# Patient Record
Sex: MALE | Race: WHITE | Employment: FULL TIME | URBAN - METROPOLITAN AREA
[De-identification: names, ages, dates, MRNs, and addresses within clinical notes are randomized per-mention and may not be internally consistent; named-entity substitution may affect disease eponyms.]

---

## 2018-05-02 ENCOUNTER — TRANSFERRED RECORDS (OUTPATIENT)
Dept: HEALTH INFORMATION MANAGEMENT | Facility: CLINIC | Age: 48
End: 2018-05-02

## 2018-05-02 LAB
CHOLESTEROL, TOTAL: 140 MG/DL
HDLC SERPL-MCNC: 33 MG/DL
LDL CHOLESTEROL: 65 MG/DL
TRIGL SERPL-MCNC: 212 MG/DL

## 2018-11-16 ENCOUNTER — HOSPITAL ENCOUNTER (EMERGENCY)
Facility: CLINIC | Age: 48
Discharge: HOME | End: 2018-11-16
Attending: EMERGENCY MEDICINE | Admitting: EMERGENCY MEDICINE
Payer: OTHER GOVERNMENT

## 2018-11-16 VITALS
OXYGEN SATURATION: 98 % | HEART RATE: 63 BPM | TEMPERATURE: 98 F | SYSTOLIC BLOOD PRESSURE: 138 MMHG | WEIGHT: 216.05 LBS | HEIGHT: 69 IN | BODY MASS INDEX: 32 KG/M2 | RESPIRATION RATE: 16 BRPM | DIASTOLIC BLOOD PRESSURE: 75 MMHG

## 2018-11-16 DIAGNOSIS — I10 BENIGN ESSENTIAL HYPERTENSION: ICD-10-CM

## 2018-11-16 DIAGNOSIS — I48.0 PAROXYSMAL ATRIAL FIBRILLATION (H): ICD-10-CM

## 2018-11-16 LAB
ANION GAP SERPL CALCULATED.3IONS-SCNC: 6 MMOL/L (ref 3–14)
BASOPHILS # BLD AUTO: 0 10E9/L (ref 0–0.2)
BASOPHILS NFR BLD AUTO: 0.2 %
BUN SERPL-MCNC: 17 MG/DL (ref 7–30)
CALCIUM SERPL-MCNC: 9.1 MG/DL (ref 8.5–10.1)
CHLORIDE SERPL-SCNC: 105 MMOL/L (ref 94–109)
CO2 SERPL-SCNC: 27 MMOL/L (ref 20–32)
CREAT SERPL-MCNC: 1.14 MG/DL (ref 0.66–1.25)
D DIMER PPP FEU-MCNC: <0.3 UG/ML FEU (ref 0–0.5)
DIFFERENTIAL METHOD BLD: NORMAL
EOSINOPHIL # BLD AUTO: 0 10E9/L (ref 0–0.7)
EOSINOPHIL NFR BLD AUTO: 0.2 %
ERYTHROCYTE [DISTWIDTH] IN BLOOD BY AUTOMATED COUNT: 12.4 % (ref 10–15)
GFR SERPL CREATININE-BSD FRML MDRD: 68 ML/MIN/1.7M2
GLUCOSE SERPL-MCNC: 137 MG/DL (ref 70–99)
HCT VFR BLD AUTO: 43.5 % (ref 40–53)
HGB BLD-MCNC: 15.2 G/DL (ref 13.3–17.7)
IMM GRANULOCYTES # BLD: 0 10E9/L (ref 0–0.4)
IMM GRANULOCYTES NFR BLD: 0.2 %
LYMPHOCYTES # BLD AUTO: 1.6 10E9/L (ref 0.8–5.3)
LYMPHOCYTES NFR BLD AUTO: 18.6 %
MCH RBC QN AUTO: 29.1 PG (ref 26.5–33)
MCHC RBC AUTO-ENTMCNC: 34.9 G/DL (ref 31.5–36.5)
MCV RBC AUTO: 83 FL (ref 78–100)
MONOCYTES # BLD AUTO: 0.5 10E9/L (ref 0–1.3)
MONOCYTES NFR BLD AUTO: 5.4 %
NEUTROPHILS # BLD AUTO: 6.5 10E9/L (ref 1.6–8.3)
NEUTROPHILS NFR BLD AUTO: 75.4 %
NRBC # BLD AUTO: 0 10*3/UL
NRBC BLD AUTO-RTO: 0 /100
NT-PROBNP SERPL-MCNC: 15 PG/ML (ref 0–900)
PLATELET # BLD AUTO: 199 10E9/L (ref 150–450)
POTASSIUM SERPL-SCNC: 3.5 MMOL/L (ref 3.4–5.3)
RBC # BLD AUTO: 5.22 10E12/L (ref 4.4–5.9)
SODIUM SERPL-SCNC: 138 MMOL/L (ref 133–144)
TROPONIN I BLD-MCNC: 0 UG/L (ref 0–0.08)
TROPONIN I SERPL-MCNC: <0.015 UG/L (ref 0–0.04)
TROPONIN I SERPL-MCNC: <0.015 UG/L (ref 0–0.04)
TSH SERPL DL<=0.005 MIU/L-ACNC: 1.44 MU/L (ref 0.4–4)
WBC # BLD AUTO: 8.7 10E9/L (ref 4–11)

## 2018-11-16 PROCEDURE — 84443 ASSAY THYROID STIM HORMONE: CPT | Performed by: EMERGENCY MEDICINE

## 2018-11-16 PROCEDURE — 85025 COMPLETE CBC W/AUTO DIFF WBC: CPT | Performed by: EMERGENCY MEDICINE

## 2018-11-16 PROCEDURE — 99285 EMERGENCY DEPT VISIT HI MDM: CPT | Mod: 25

## 2018-11-16 PROCEDURE — 40000275 ZZH STATISTIC RCP TIME EA 10 MIN

## 2018-11-16 PROCEDURE — 96366 THER/PROPH/DIAG IV INF ADDON: CPT | Mod: 59

## 2018-11-16 PROCEDURE — 85379 FIBRIN DEGRADATION QUANT: CPT | Performed by: EMERGENCY MEDICINE

## 2018-11-16 PROCEDURE — 96365 THER/PROPH/DIAG IV INF INIT: CPT | Mod: 59

## 2018-11-16 PROCEDURE — 25000125 ZZHC RX 250: Performed by: EMERGENCY MEDICINE

## 2018-11-16 PROCEDURE — 83880 ASSAY OF NATRIURETIC PEPTIDE: CPT | Performed by: EMERGENCY MEDICINE

## 2018-11-16 PROCEDURE — 92960 CARDIOVERSION ELECTRIC EXT: CPT

## 2018-11-16 PROCEDURE — 25000132 ZZH RX MED GY IP 250 OP 250 PS 637: Performed by: EMERGENCY MEDICINE

## 2018-11-16 PROCEDURE — 84484 ASSAY OF TROPONIN QUANT: CPT | Mod: 91 | Performed by: EMERGENCY MEDICINE

## 2018-11-16 PROCEDURE — 25000128 H RX IP 250 OP 636: Performed by: EMERGENCY MEDICINE

## 2018-11-16 PROCEDURE — 84484 ASSAY OF TROPONIN QUANT: CPT

## 2018-11-16 PROCEDURE — 80048 BASIC METABOLIC PNL TOTAL CA: CPT | Performed by: EMERGENCY MEDICINE

## 2018-11-16 RX ORDER — PROPOFOL 10 MG/ML
.5-1 INJECTION, EMULSION INTRAVENOUS ONCE
Status: DISCONTINUED | OUTPATIENT
Start: 2018-11-16 | End: 2018-11-16 | Stop reason: HOSPADM

## 2018-11-16 RX ORDER — DILTIAZEM HYDROCHLORIDE 5 MG/ML
20 INJECTION INTRAVENOUS ONCE
Status: COMPLETED | OUTPATIENT
Start: 2018-11-16 | End: 2018-11-16

## 2018-11-16 RX ORDER — NALOXONE HYDROCHLORIDE 0.4 MG/ML
.1-.4 INJECTION, SOLUTION INTRAMUSCULAR; INTRAVENOUS; SUBCUTANEOUS
Status: DISCONTINUED | OUTPATIENT
Start: 2018-11-16 | End: 2018-11-16 | Stop reason: HOSPADM

## 2018-11-16 RX ORDER — PROPOFOL 10 MG/ML
0.1 INJECTION, EMULSION INTRAVENOUS
Status: DISCONTINUED | OUTPATIENT
Start: 2018-11-16 | End: 2018-11-16 | Stop reason: HOSPADM

## 2018-11-16 RX ORDER — METOPROLOL SUCCINATE 50 MG/1
50 TABLET, EXTENDED RELEASE ORAL ONCE
Status: COMPLETED | OUTPATIENT
Start: 2018-11-16 | End: 2018-11-16

## 2018-11-16 RX ORDER — FLUMAZENIL 0.1 MG/ML
0.2 INJECTION, SOLUTION INTRAVENOUS
Status: DISCONTINUED | OUTPATIENT
Start: 2018-11-16 | End: 2018-11-16 | Stop reason: HOSPADM

## 2018-11-16 RX ORDER — METOPROLOL SUCCINATE 50 MG/1
50 TABLET, EXTENDED RELEASE ORAL DAILY
Qty: 30 TABLET | Refills: 1 | Status: SHIPPED | OUTPATIENT
Start: 2018-11-16 | End: 2020-03-05

## 2018-11-16 RX ADMIN — METOPROLOL SUCCINATE 50 MG: 50 TABLET, EXTENDED RELEASE ORAL at 20:50

## 2018-11-16 RX ADMIN — DILTIAZEM HYDROCHLORIDE 10 MG/HR: 5 INJECTION INTRAVENOUS at 18:06

## 2018-11-16 RX ADMIN — DILTIAZEM HYDROCHLORIDE 20 MG: 5 INJECTION INTRAVENOUS at 18:06

## 2018-11-16 ASSESSMENT — ENCOUNTER SYMPTOMS
PALPITATIONS: 1
COUGH: 0
SHORTNESS OF BREATH: 1
NERVOUS/ANXIOUS: 1
CHEST TIGHTNESS: 1

## 2018-11-16 NOTE — ED PROVIDER NOTES
History     Chief Complaint:  Chest Pain    HPI   Jorge Drummond is a 48 year old male with history of hypertension, who presents for evaluation of chest pain. 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 Around lunch he began feeling that his blood pressure was elevated, had palpitations, labored breathing, and had a tightness in his chest. He also felt an increase in anxiety. He took two Advil prior to leaving work with EMS. He presents to the ED by EMS and was given two nitroglycerin and 4 baby aspirin, which he states brought his chest tightness down to a 2/10 from a 6/10. No coughing up blood, pain or swelling in his legs. Of note, he had chest tightness in the past and reports being seen by a doctor in [place] at [facility]. He had a CT coronary angiogram, see results below. He is unsure if he has had arrhythmia in the past, denies history of heart attacks, or stent placements.    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  This     represents a malignant coronary artery anomaly.        No coronary artery atherosclerotic disease. Calcium score = 0.          Note:  Findings discussed with Dr. Benjamín Doran on 8/31/2010 at 1145     hours.        Thanh Doran MD    Body/Diagnostic Radiologist    D&T: 8/31/2010       Cardiac/PE/DVT Risk Factors:  History of hypertension - Yes  History of hyperlipidemia - No  History of diabetes - No  History of smoking - Yes, occasional cigars.  Personal history of PE/DVT - No  Family history of PE/DVT - No  Family history of heart complications - Yes, father had a heart attack at 50 years old.  Recent travel - No  Recent surgery - No  Other immobilizations - No  Cancer - No    Allergies:  No Known Drug Allergies      Medications:    The patient is not currently  taking any prescribed medications.     Past Medical History:    Hypertension    Past Surgical History:    The patient does not have any pertinent past surgical history.     Family History:    Father: heart attack at age 50    Social History:  Tobacco use: Yes, occasional cigar.  Alcohol use: No  The patient presents via ambulance.        Review of Systems   Respiratory: Positive for chest tightness and shortness of breath. Negative for cough.    Cardiovascular: Positive for chest pain and palpitations. Negative for leg swelling.   Psychiatric/Behavioral: The patient is nervous/anxious.    All other systems reviewed and are negative.    Physical Exam     Patient Vitals for the past 24 hrs:   BP Temp Temp src Pulse Heart Rate Resp SpO2 Height Weight   11/16/18 2054 138/75 - - 63 - 16 98 % - -   11/16/18 2045 (!) 188/138 - - - 66 - 99 % - -   11/16/18 2030 (!) 175/110 - - - - - - - -   11/16/18 2015 (!) 180/100 - - - - - - - -   11/16/18 2000 (!) 189/106 - - - 63 - 98 % - -   11/16/18 1945 (!) 170/98 - - - 65 - 98 % - -   11/16/18 1930 178/89 - - - 83 - 98 % - -   11/16/18 1915 (!) 144/93 - - - 70 - 100 % - -   11/16/18 1900 147/80 - - - 73 - 100 % - -   11/16/18 1845 135/70 - - - 83 - 99 % - -   11/16/18 1834 - - - - - - - - 98 kg (216 lb 0.8 oz)   11/16/18 1830 139/77 - - - 78 - 96 % - -   11/16/18 1815 146/73 - - - 68 - (!) 85 % - -   11/16/18 1800 138/75 - - - 116 - 100 % - -   11/16/18 1745 134/77 - - - 116 - 97 % - -   11/16/18 1730 (!) 143/95 - - - - - - - -   11/16/18 1715 (!) 147/99 - - - 104 - 93 % - -   11/16/18 1700 143/85 - - - 108 - 100 % - -   11/16/18 1645 140/82 - - - 109 - 99 % - -   11/16/18 1630 153/72 - - - 115 - 100 % - -   11/16/18 1615 108/82 - - - - - - - -   11/16/18 1600 (!) 151/101 - - - 115 - 91 % - -   11/16/18 1545 153/82 - - - 111 - 93 % - -   11/16/18 1530 159/73 - - - 107 - - - -   11/16/18 1515 (!) 170/102 - - - - - - - -   11/16/18 1500 146/90 - - - - - - - -   11/16/18 1453 145/86  "98  F (36.7  C) Oral 121 - 16 - 1.753 m (5' 9\") -        Physical Exam  General: Well-nourished  Eyes: PERRL, conjunctivae pink no scleral icterus or conjunctival injection  ENT:  Moist mucus membranes, posterior oropharynx clear without erythema or exudates  Respiratory:  Lungs clear to auscultation bilaterally, no crackles/rubs/wheezes.  Good air movement  CV: Irregularly irregular and tachycardic rate and rhythm, no murmurs/rubs/gallops  GI:  Abdomen soft and non-distended.  Normoactive BS.  No tenderness, guarding or rebound  Skin: Warm, dry.  No rashes or petechiae  Musculoskeletal: No peripheral edema or calf tenderness  Neuro: Alert and oriented to person/place/time  Psychiatric: Normal affect    Emergency Department Course     ECG:  Indication: Chest Pain  Completed at 1508.  Read at 1515.  Atrial fibrillation with rapid ventricular response  Septal infarct, age undetermined  Abnormal ECG   Rate 128 bpm. NH interval *. QRS duration 88. QT/QTc 328/478. P-R-T axes *  52  29.     ECG: Post-cardioversion  Completed at 1928.  Read at 1934.  Normal sinus rhythm  Left axis deviation  Septal infarct, age undetermined  Abnormal ECG   Rate 77 bpm. NH interval 180. QRS duration 94. QT/QTc 408/461. P-R-T axes 44  -34  27.     Laboratory:  Laboratory findings were communicated with the patient who voiced understanding of the findings.  Troponin (Collected 1517): <0.015   Troponin POCT (Collected 1523): 0.00   Troponin (Collected 1804): <0.015   D Dimer (Collected 1517): <0.3   CBC: AWNL (WBC 8.7, HGB 15.2, )  TSH with free T4 reflex: 1.44   BNP: 15  BMP: Glucose: 137(H) o/w WNL (Creatinine 1.14)     Procedures:        [facility] Procedure Note:  Electrical Cardioversion:     Performed by Deb Herron MD     Pre Procedure Rhythm:  Atrial Fibrillation  Consent: Consent given by: Patient who states understanding of the procedure being performed after discussing the  risks, benefits and alternatives.  Time out: " "Immediately prior to procedure a \"time out\" was called to verify the correct patient, procedure, equipment, support staff and site/side marked as required.    Sedation:  Patient sedated with Propofol  Vital signs, airway and pulse oximetry were monitored and remained stable throughout the procedure and sedation was maintained until the procedure was complete.  The patient was monitored with staff at the bedside until he fully regained consciousness.  Procedure: The patient was placed in the supine position and the chest area was exposed. The cardioversion pads were applied in the standard manner and configuration.     The Biphasic defibrillator was set on the Synchronized mode and the patient was shocked at 120  Joules, resulting in Sinus Rhythm.  The Patient tolerated the procedure well with no immediate complications.      Interventions:  1806 Diltiazem 10 mg/hr IV  1806 Diltiazem injection 20 mg IV  Propofol 100 mg IV  2050 Metoprolol succinate     Emergency Department Course:  Nursing notes and vitals reviewed.  EKG obtained in the ED, see results above.    IV was inserted and blood was drawn for laboratory testing, results above.   The patient was sent for a XR while in the emergency department, results above.      1500 I performed an exam of the patient as documented above.   1722 I spoke with Dr. Brady Navarro, of cardiology.   1919 I performed the cardioversion, see above procedure note.     Findings and plan explained to the patient. Patient discharged home with instructions regarding supportive care, medications, and reasons to return. The importance of close follow-up was reviewed.     I personally reviewed the laboratory results with the patient and answered all related questions prior to discharge.    Impression & Plan      Medical Decision Making:  Dustin Allison is a 34 year old male comes today with complaint of palpitations.  The patient is found to be in atrial fibrillation with RVR. There are no signs " of cardiac ischemia and no signs or symptoms of pulmonary embolism, pneumonia or other acute process.  We rate controlled initially with cardizem while awaiting cardioversion.  Given that the duration since onset is known to be less than 48 hours, I did feel that the patient was safe to cardiovert in the ED and discussed with Dr Navarro on call, specifically discussing his right coronary artery anomaly and he was in agreement.  I discussed with the patient the risk of ~1% thromboembolism and stroke as well of the risks of sedation and the procedure and he consented to the procedure.  The cardioversion was successful.  The patient was awake, tolerated po and well-appearing.  We will discharge home on metoprolol, aspirin and follow-up with cardiology/electrophysiology this week.  He was given appropriate return precautions.     Diagnosis:    ICD-10-CM    1. Paroxysmal atrial fibrillation (H) I48.0 Follow-Up with Cardiologist   2. Benign essential hypertension I10 Follow-Up with Cardiologist      Disposition:   Discharged to home    Discharge Medications:  New Prescriptions    METOPROLOL SUCCINATE (TOPROL-XL) 50 MG 24 HR TABLET    Take 1 tablet (50 mg) by mouth daily       Scribe Disclosure:  Guera BRAY, am serving as a scribe at 3:38 PM on 11/16/2018 to document services personally performed by Deb Herron MD, based on my observations and the provider's statements to me.     Guera Kennedy  11/16/2018   [facility] EMERGENCY DEPARTMENT       Deb Herron MD  11/16/18 [id number]

## 2018-11-16 NOTE — ED AVS SNAPSHOT
[facility]    [address]    [facility] [address]    Phone:  [phone]    Fax:  [phone]                                       Jorge Drummond   MRN: [id number]    Department:  [facility]   Date of Visit:  11/16/2018           After Visit Summary Signature Page     I have received my discharge instructions, and my questions have been answered. I have discussed any challenges I see with this plan with the nurse or doctor.    ..........................................................................................................................................  Patient/Patient Representative Signature      ..........................................................................................................................................  Patient Representative Print Name and Relationship to Patient    ..................................................               ................................................  Date                                   Time    ..........................................................................................................................................  Reviewed by Signature/Title    ...................................................              ..............................................  Date                                               Time          22EPIC Rev 08/18

## 2018-11-16 NOTE — ED NOTES
Patient has voided x4 , since arrival clear fela urine . While standing to void he had an increase in hr 150's 60's , patient states he has ni increase in symptoms with increase in heartrate

## 2018-11-16 NOTE — ED TRIAGE NOTES
Having chest pressure today started at a 6 on 1/10 scale , EMS gave nitroglycerin x2 now pressure a 2 . Positive for sob and lightheaded in car  So called EMS

## 2018-11-16 NOTE — ED AVS SNAPSHOT
[facility] Emergency Department    [address] [place] [address]    Phone:  [phone]    Fax:  [phone]                                       Jorge Drummond   MRN: [id number]    Department:  [facility] Emergency Department   Date of Visit:  11/16/2018           Patient Information     Date Of Birth          1970        Your diagnoses for this visit were:     Paroxysmal atrial fibrillation (H)     Benign essential hypertension        You were seen by Deb Herron MD.      Follow-up Information     Follow up with [facility]. Schedule an appointment as soon as possible for a visit in 3 days.    Contact information:    [address] [place] [id number]          Follow up with Alexander MD. Schedule an appointment as soon as possible for a visit in 1 week.    Specialty:  Cardiology    Contact information:    [address]  [place] [address]  [phone]          Discharge Instructions       *Avoid heavy exertion until cleared by cardiology.  Take a baby aspirin daily.  *Stop taking Norvasc.  Start taking toprol XL tomorrow night.   *Follow-up with cardiology in the next 1-2 weeks.  *Return if you develop recurrence, faint or feel like you will faint or become worse in any way.        Understanding Atrial Fibrillation    An arrhythmia is any problem with the speed or pattern of the heartbeat. Atrial fibrillation (AFib) is a common type of arrhythmia. It causes fast, chaotic electrical signals in the atria. This leads to poor functioning of the heart. It also affects how much blood your heart can pump out to the body.  Afib may occur once in a while and go away on its own. Or it may continue for longer periods and need treatment.  AFib can lead to serious problems, such as stroke. Your healthcare provider will need to monitor and manage it.  What happens during atrial fibrillation?   The heart has an  electrical system that sends signals to control the heartbeat. As signals move through the heart, they tell the heart s upper chambers (atria) and lower chambers (ventricles) when to squeeze (contract) and relax. This lets blood move through the heart and out to the body and lungs.  With AFib, the atria receive abnormal signals. This causes them to contract in a fast and irregular way, and out of sync with the ventricles. When this happens, the atria also have a harder time moving blood into the ventricles. Blood may then pool in the atria, which increases the risk for blood clots and stroke. The ventricles also may contract too quickly and irregularly. As a result, they may not pump blood to the body and lungs as well as they should. This can weaken the heart muscle over time and cause heart failure.  What causes atrial fibrillation?  AFib is more common in older adults. It has many possible causes including:    Coronary artery disease    Heart valve disease    Heart attack    Heart surgery    High blood pressure    Thyroid disease    Diabetes    Lung disease    Sleep apnea    Heavy alcohol use  In some cases of AFib, doctors do not know the cause.  What are the symptoms of atrial fibrillation?  AFib may or may not cause symptoms. If symptoms do occur, they may include:    A fast, pounding, irregular heartbeat    Shortness of breath    Tiredness    Dizziness or fainting    Chest pain  How is atrial fibrillation treated?  Treatments for AFib can include any of the options below.    Medicines. You may be prescribed:  ? Heart rate medicines to help slow down the heartbeat  ? Heart rhythm medicines to help the heart beat more regularly  ? Anti-clotting medicines to help reduce the risk for blood clots and stroke    Electrical cardioversion. Your healthcare provider uses special pads or paddles to send one or more brief electrical shocks to the heart. This can help reset the heartbeat to normal.    Ablation. Long, thin  tubes called catheters are threaded through a blood vessel to the heart. There, the catheters send out hot or cold energy to the areas causing the abnormal signals. This energy destroys the problem tissue or cells. This improves the chances that your heart will stay in normal rhythm without using medicines. If your heart rate and rhythm can t be controlled, you may need ablation and a pacemaker. These will help control the heart rate and regularity of the heartbeat.    Surgery. During surgery, your healthcare provider may use different methods to create scar tissue in the areas of the heart causing the abnormal signals. The scar tissue disrupts the abnormal signals and may stop AFib from occurring.  What are the complications of atrial fibrillation?  These can include:    Blood clots    Stroke    Heart failure. This problem occurs when the heart muscle weakens so much that it can no longer pump blood well.  When should I call my healthcare provider?  Call your healthcare provider right away if you have any of these:    Symptoms that don t get better with treatment, or get worse    New symptoms   Date Last Reviewed: 5/1/2016 2000-2018 [employer]. [address] [place] [address]. All rights reserved. This information is not intended as a substitute for professional medical care. Always follow your healthcare professional's instructions.          Discharge Instructions  Chest Pain    You have been seen today for chest pain or discomfort.  At this time, your provider has found no signs that your chest pain is due to a serious or life-threatening condition, (or you have declined more testing and/or admission to the hospital). However, sometimes there is a serious problem that does not show up right away. Your evaluation today may not be complete and you may need further testing and evaluation.     Generally, every Emergency Department visit should have a follow-up clinic visit with either a  primary or a specialty clinic/provider. Please follow-up as instructed by your emergency provider today.  Return to the Emergency Department if:    Your chest pain changes, gets worse, starts to happen more often, or comes with less activity.    You are newly short of breath.    You get very weak or tired.    You pass out or faint.    You have any new symptoms, like fever, cough, numb legs, or you cough up blood.    You have anything else that worries you.    Until you follow-up with your regular provider, please do the following:    Take one aspirin daily unless you have an allergy or are told not to by your provider.    If a stress test appointment has been made, go to the appointment.    If you have questions, contact your regular provider.    Follow-up with your regular provider/clinic as directed; this is very important.    If you were given a prescription for medicine here today, be sure to read all of the information (including the package insert) that comes with your prescription.  This will include important information about the medicine, its side effects, and any warnings that you need to know about.  The pharmacist who fills the prescription can provide more information and answer questions you may have about the medicine.  If you have questions or concerns that the pharmacist cannot address, please call or return to the Emergency Department.       Remember that you can always come back to the Emergency Department if you are not able to see your regular provider in the amount of time listed above, if you get any new symptoms, or if there is anything that worries you.        24 Hour Appointment Hotline       To make an appointment at any [facility], call [phone] ([phone]). If you don't have a family doctor or clinic, we will help you find one. [facility] are conveniently located to serve the needs of you and your family.          ED Discharge Orders     Follow-Up with Cardiologist                     Review of your medicines      START taking        Dose / Directions Last dose taken    metoprolol succinate 50 MG 24 hr tablet   Commonly known as:  TOPROL-XL   Dose:  50 mg   Quantity:  30 tablet        Take 1 tablet (50 mg) by mouth daily   Refills:  1                Prescriptions were sent or printed at these locations (1 Prescription)                   Other Prescriptions                Printed at Department/Unit printer (1 of 1)         metoprolol succinate (TOPROL-XL) 50 MG 24 hr tablet                Procedures and tests performed during your visit     Procedure/Test Number of Times Performed    Basic metabolic panel 1    Blood pressure 1    CBC with platelets differential 1    Cardiac Continuous Monitoring 1    D dimer quantitative 1    EKG 12 lead 1    EKG 12-lead, tracing only 1    Nt probnp inpatient (BNP) 1    Peripheral IV: Standard 1    Pulse oximetry nursing 1    Review medications with patient 1    TSH with free T4 reflex 1    Troponin I 2    Troponin POCT 1    Vital signs 1      Orders Needing Specimen Collection     None      Pending Results     Date and Time Order Name Status Description    11/16/2018 1932 EKG 12 lead Preliminary     11/16/2018 1509 EKG 12-lead, tracing only Preliminary             Pending Culture Results     No orders found from 11/14/2018 to 11/17/2018.            Pending Results Instructions     If you had any lab results that were not finalized at the time of your Discharge, you can call the ED Lab Result RN at [phone]. You will be contacted by this team for any positive Lab results or changes in treatment. The nurses are available 7 days a week from 10A to 6:30P.  You can leave a message 24 hours per day and they will return your call.        Test Results From Your Hospital Stay        11/16/2018  3:35 PM      Component Results     Component Value Ref Range & Units Status    WBC 8.7 4.0 - 11.0 10e9/L Final    RBC Count 5.22 4.4 - 5.9 10e12/L Final     Hemoglobin 15.2 13.3 - 17.7 g/dL Final    Hematocrit 43.5 40.0 - 53.0 % Final    MCV 83 78 - 100 fl Final    MCH 29.1 26.5 - 33.0 pg Final    MCHC 34.9 31.5 - 36.5 g/dL Final    RDW 12.4 10.0 - 15.0 % Final    Platelet Count 199 150 - 450 10e9/L Final    Diff Method Automated Method  Final    % Neutrophils 75.4 % Final    % Lymphocytes 18.6 % Final    % Monocytes 5.4 % Final    % Eosinophils 0.2 % Final    % Basophils 0.2 % Final    % Immature Granulocytes 0.2 % Final    Nucleated RBCs 0 0 /100 Final    Absolute Neutrophil 6.5 1.6 - 8.3 10e9/L Final    Absolute Lymphocytes 1.6 0.8 - 5.3 10e9/L Final    Absolute Monocytes 0.5 0.0 - 1.3 10e9/L Final    Absolute Eosinophils 0.0 0.0 - 0.7 10e9/L Final    Absolute Basophils 0.0 0.0 - 0.2 10e9/L Final    Abs Immature Granulocytes 0.0 0 - 0.4 10e9/L Final    Absolute Nucleated RBC 0.0  Final         11/16/2018  3:59 PM      Component Results     Component Value Ref Range & Units Status    TSH 1.44 0.40 - 4.00 mU/L Final         11/16/2018  3:55 PM      Component Results     Component Value Ref Range & Units Status    Troponin I ES <0.015 0.000 - 0.045 ug/L Final    The 99th percentile for upper reference range is 0.045 ug/L.  Troponin values   in the range of 0.045 - 0.120 ug/L may be associated with risks of adverse   clinical events.           11/16/2018  3:55 PM      Component Results     Component Value Ref Range & Units Status    N-Terminal Pro BNP Inpatient 15 0 - 900 pg/mL Final       Reference range shown and results flagged as abnormal are suggested inpatient   cut points for confirming diagnosis if CHF in an acute setting. Establishing a   baseline value for each individual patient is useful for follow-up. An   inpatient or emergency department NT-proPBNP <300 pg/mL effectively rules out   acute CHF, with 99% negative predictive value.  The outpatient non-acute reference range for ruling out CHF is:   0-125 pg/mL (age 18 to less than 75)   0-450 pg/mL (age 75 yrs  and older)           11/16/2018  3:55 PM      Component Results     Component Value Ref Range & Units Status    Sodium 138 133 - 144 mmol/L Final    Potassium 3.5 3.4 - 5.3 mmol/L Final    Chloride 105 94 - 109 mmol/L Final    Carbon Dioxide 27 20 - 32 mmol/L Final    Anion Gap 6 3 - 14 mmol/L Final    Glucose 137 (H) 70 - 99 mg/dL Final    Urea Nitrogen 17 7 - 30 mg/dL Final    Creatinine 1.14 0.66 - 1.25 mg/dL Final    GFR Estimate 68 >60 mL/min/1.7m2 Final    Non  GFR Calc    GFR Estimate If Black 82 >60 mL/min/1.7m2 Final    African American GFR Calc    Calcium 9.1 8.5 - 10.1 mg/dL Final         11/16/2018  3:47 PM      Component Results     Component Value Ref Range & Units Status    D Dimer <0.3 0.0 - 0.50 ug/ml FEU Final    This D-dimer assay is intended for use in conjunction with a clinical pretest   probability assessment model to exclude pulmonary embolism (PE) and deep   venous thrombosis (DVT) in outpatients suspected of PE or DVT. The cut-off   value is 0.5 ug/mL FEU.           11/16/2018  3:35 PM      Component Results     Component Value Ref Range & Units Status    Troponin I 0.00 0.00 - 0.08 ug/L Final         11/16/2018  6:33 PM      Component Results     Component Value Ref Range & Units Status    Troponin I ES <0.015 0.000 - 0.045 ug/L Final    The 99th percentile for upper reference range is 0.045 ug/L.  Troponin values   in the range of 0.045 - 0.120 ug/L may be associated with risks of adverse   clinical events.                  Clinical Quality Measure: Blood Pressure Screening     Your blood pressure was checked while you were in the emergency department today. The last reading we obtained was  BP: 138/75 . Please read the guidelines below about what these numbers mean and what you should do about them.  If your systolic blood pressure (the top number) is less than 120 and your diastolic blood pressure (the bottom number) is less than 80, then your blood pressure is normal.  "There is nothing more that you need to do about it.  If your systolic blood pressure (the top number) is 120-139 or your diastolic blood pressure (the bottom number) is 80-89, your blood pressure may be higher than it should be. You should have your blood pressure rechecked within a year by a primary care provider.  If your systolic blood pressure (the top number) is 140 or greater or your diastolic blood pressure (the bottom number) is 90 or greater, you may have high blood pressure. High blood pressure is treatable, but if left untreated over time it can put you at risk for heart attack, stroke, or kidney failure. You should have your blood pressure rechecked by a primary care provider within the next 4 weeks.  If your provider in the emergency department today gave you specific instructions to follow-up with your doctor or provider even sooner than that, you should follow that instruction and not wait for up to 4 weeks for your follow-up visit.        Thank you for choosing [facility]       Thank you for choosing [facility] for your care. Our goal is always to provide you with excellent care. Hearing back from our patients is one way we can continue to improve our services. Please take a few minutes to complete the written survey that you may receive in the mail after you visit with us. Thank you!        [employer] Information     [employer] lets you send messages to your doctor, view your test results, renew your prescriptions, schedule appointments and more. To sign up, go to [email or web address] . Click on \"Log in\" on the left side of the screen, which will take you to the Welcome page. Then click on \"Sign up Now\" on the right side of the page.     You will be asked to enter the access code listed below, as well as some personal information. Please follow the directions to create your username and password.     Your access code is: [email or web address]  Expires: 2019  8:55 PM     Your access code will  in 90 days. " If you need help or a new code, please call your [facility] clinic or [phone].        Care EveryWhere ID     This is your Care EveryWhere ID. This could be used by other organizations to access your [facility] medical records  [id number]        Equal Access to Services     MELLISSA GARCIA : Michaela Sage, milton sarmientoadaha, qaalexandria kaalmabrooke hoskins, sanaz hare. So [facility] [phone].    ATENCIÓN: Si habla español, tiene a guzmán disposición servicios gratuitos de asistencia lingüística. Llame al [phone].    We comply with applicable federal civil rights laws and [place] laws. We do not discriminate on the basis of race, color, national origin, age, disability, sex, sexual orientation, or gender identity.            After Visit Summary       This is your record. Keep this with you and show to your community pharmacist(s) and doctor(s) at your next visit.

## 2018-11-17 LAB
INTERPRETATION ECG - MUSE: NORMAL
INTERPRETATION ECG - MUSE: NORMAL

## 2018-11-17 NOTE — PROGRESS NOTES
RT- Attended conscious sedation. Ambu bag and suction set up and ready if needed. Patient was placed on ETCO2 with oxygen. Stayed until patient was awake and alert.     Neal Shoemaker, RT  November 16, 2018 7:33 PM

## 2018-11-17 NOTE — DISCHARGE INSTRUCTIONS
*Avoid heavy exertion until cleared by cardiology.  Take a baby aspirin daily.  *Stop taking Norvasc.  Start taking toprol XL tomorrow night.   *Follow-up with cardiology in the next 1-2 weeks.  *Return if you develop recurrence, faint or feel like you will faint or become worse in any way.        Understanding Atrial Fibrillation    An arrhythmia is any problem with the speed or pattern of the heartbeat. Atrial fibrillation (AFib) is a common type of arrhythmia. It causes fast, chaotic electrical signals in the atria. This leads to poor functioning of the heart. It also affects how much blood your heart can pump out to the body.  Afib may occur once in a while and go away on its own. Or it may continue for longer periods and need treatment.  AFib can lead to serious problems, such as stroke. Your healthcare provider will need to monitor and manage it.  What happens during atrial fibrillation?   The heart has an electrical system that sends signals to control the heartbeat. As signals move through the heart, they tell the heart s upper chambers (atria) and lower chambers (ventricles) when to squeeze (contract) and relax. This lets blood move through the heart and out to the body and lungs.  With AFib, the atria receive abnormal signals. This causes them to contract in a fast and irregular way, and out of sync with the ventricles. When this happens, the atria also have a harder time moving blood into the ventricles. Blood may then pool in the atria, which increases the risk for blood clots and stroke. The ventricles also may contract too quickly and irregularly. As a result, they may not pump blood to the body and lungs as well as they should. This can weaken the heart muscle over time and cause heart failure.  What causes atrial fibrillation?  AFib is more common in older adults. It has many possible causes including:    Coronary artery disease    Heart valve disease    Heart attack    Heart surgery    High blood  pressure    Thyroid disease    Diabetes    Lung disease    Sleep apnea    Heavy alcohol use  In some cases of AFib, doctors do not know the cause.  What are the symptoms of atrial fibrillation?  AFib may or may not cause symptoms. If symptoms do occur, they may include:    A fast, pounding, irregular heartbeat    Shortness of breath    Tiredness    Dizziness or fainting    Chest pain  How is atrial fibrillation treated?  Treatments for AFib can include any of the options below.    Medicines. You may be prescribed:  ? Heart rate medicines to help slow down the heartbeat  ? Heart rhythm medicines to help the heart beat more regularly  ? Anti-clotting medicines to help reduce the risk for blood clots and stroke    Electrical cardioversion. Your healthcare provider uses special pads or paddles to send one or more brief electrical shocks to the heart. This can help reset the heartbeat to normal.    Ablation. Long, thin tubes called catheters are threaded through a blood vessel to the heart. There, the catheters send out hot or cold energy to the areas causing the abnormal signals. This energy destroys the problem tissue or cells. This improves the chances that your heart will stay in normal rhythm without using medicines. If your heart rate and rhythm can t be controlled, you may need ablation and a pacemaker. These will help control the heart rate and regularity of the heartbeat.    Surgery. During surgery, your healthcare provider may use different methods to create scar tissue in the areas of the heart causing the abnormal signals. The scar tissue disrupts the abnormal signals and may stop AFib from occurring.  What are the complications of atrial fibrillation?  These can include:    Blood clots    Stroke    Heart failure. This problem occurs when the heart muscle weakens so much that it can no longer pump blood well.  When should I call my healthcare provider?  Call your healthcare provider right away if you have any  of these:    Symptoms that don t get better with treatment, or get worse    New symptoms   Date Last Reviewed: 5/1/2016 2000-2018 [employer]. [address] [place] [address], [place] [address]. All rights reserved. This information is not intended as a substitute for professional medical care. Always follow your healthcare professional's instructions.          Discharge Instructions  Chest Pain    You have been seen today for chest pain or discomfort.  At this time, your provider has found no signs that your chest pain is due to a serious or life-threatening condition, (or you have declined more testing and/or admission to the hospital). However, sometimes there is a serious problem that does not show up right away. Your evaluation today may not be complete and you may need further testing and evaluation.     Generally, every Emergency Department visit should have a follow-up clinic visit with either a primary or a specialty clinic/provider. Please follow-up as instructed by your emergency provider today.  Return to the Emergency Department if:    Your chest pain changes, gets worse, starts to happen more often, or comes with less activity.    You are newly short of breath.    You get very weak or tired.    You pass out or faint.    You have any new symptoms, like fever, cough, numb legs, or you cough up blood.    You have anything else that worries you.    Until you follow-up with your regular provider, please do the following:    Take one aspirin daily unless you have an allergy or are told not to by your provider.    If a stress test appointment has been made, go to the appointment.    If you have questions, contact your regular provider.    Follow-up with your regular provider/clinic as directed; this is very important.    If you were given a prescription for medicine here today, be sure to read all of the information (including the package insert) that comes with your prescription.  This will include  important information about the medicine, its side effects, and any warnings that you need to know about.  The pharmacist who fills the prescription can provide more information and answer questions you may have about the medicine.  If you have questions or concerns that the pharmacist cannot address, please call or return to the Emergency Department.       Remember that you can always come back to the Emergency Department if you are not able to see your regular provider in the amount of time listed above, if you get any new symptoms, or if there is anything that worries you.

## 2018-11-19 PROBLEM — I10 BENIGN ESSENTIAL HYPERTENSION: Status: ACTIVE | Noted: 2018-11-19

## 2018-11-19 PROBLEM — I48.0 PAROXYSMAL ATRIAL FIBRILLATION WITH RVR (H): Status: ACTIVE | Noted: 2018-11-19

## 2018-11-20 ENCOUNTER — OFFICE VISIT (OUTPATIENT)
Dept: CARDIOLOGY | Facility: CLINIC | Age: 48
End: 2018-11-20
Attending: EMERGENCY MEDICINE
Payer: OTHER GOVERNMENT

## 2018-11-20 VITALS
HEART RATE: 57 BPM | BODY MASS INDEX: 33.59 KG/M2 | HEIGHT: 69 IN | DIASTOLIC BLOOD PRESSURE: 70 MMHG | SYSTOLIC BLOOD PRESSURE: 121 MMHG | WEIGHT: 226.8 LBS

## 2018-11-20 DIAGNOSIS — I10 BENIGN ESSENTIAL HYPERTENSION: ICD-10-CM

## 2018-11-20 DIAGNOSIS — Q24.5 ANOMALOUS ORIGIN OF RIGHT CORONARY ARTERY: Primary | ICD-10-CM

## 2018-11-20 DIAGNOSIS — I48.0 PAROXYSMAL ATRIAL FIBRILLATION (H): ICD-10-CM

## 2018-11-20 PROCEDURE — 93000 ELECTROCARDIOGRAM COMPLETE: CPT | Performed by: INTERNAL MEDICINE

## 2018-11-20 PROCEDURE — 99204 OFFICE O/P NEW MOD 45 MIN: CPT | Performed by: INTERNAL MEDICINE

## 2018-11-20 RX ORDER — SIMVASTATIN 20 MG
10 TABLET ORAL
COMMUNITY
Start: 2015-03-06 | End: 2024-01-08

## 2018-11-20 RX ORDER — ASPIRIN 325 MG
325 TABLET ORAL DAILY
COMMUNITY
Start: 2018-11-20 | End: 2020-03-05

## 2018-11-20 RX ORDER — FINASTERIDE 5 MG/1
5 TABLET, FILM COATED ORAL DAILY
COMMUNITY

## 2018-11-20 RX ORDER — AMLODIPINE BESYLATE 10 MG/1
10 TABLET ORAL
COMMUNITY
End: 2019-01-03 | Stop reason: ALTCHOICE

## 2018-11-20 RX ORDER — CYCLOBENZAPRINE HCL 10 MG
5 TABLET ORAL PRN
COMMUNITY

## 2018-11-20 NOTE — LETTER
11/20/2018    [address] [address]    RE: Jorge Hoda       Dear Colleague,    I had the pleasure of seeing Jorge Drummond in the [facility].    HPI and Plan:   I had the pleasure of seeing Jorge Drummond in cardiology consultation on request of Dr. Herron from the emergency room for new episode of atrial fibrillation.    Jorge is a pleasant 48-year-old male.  He had sudden onset shortness of breath last week at work and while driving home he felt more dizzy and had some chest tightness.  He decided to go to the emergency room and was noted to be in atrial fibrillation.  He started noticing some palpitations.  He was noted to be in rapid atrial fibrillation in the emergency room.  He was given IV diltiazem but the A. fib continued and then was electively cardioverted by the emergency room physician.  He was discharged home in sinus rhythm.    Since then, he has had no palpitations or shortness of breath.  He has occasional chest tightness, nonexertional lasting for a few minutes and subsides on its own.  Since discharge, metoprolol has been added to his regimen.  He was on amlodipine and finasteride.  Amlodipine is for high blood pressure.    He has dyslipidemia is on simvastatin.    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  This was labeled as a malignant anomaly by the radiologist in 2010 in [place] and he saw a cardiologist at [place] at that time.  The cardiologist name was Dr. Stokes.  He was asked to avoid any strenuous exercise.  A stress echo was done which was negative.  Option of conservative management and medical therapy and avoiding strenuous exercise versus surgery was offered but he favored conservative management.    EKG today with sinus rhythm with no ischemic  changes.    Physical exam  See below    Impression  1.  Paroxysmal atrial fibrillation  Patient episode of atrial ablation which was new.  He was electively cardioverted in the emergency room and is now on metoprolol.  His chads vascular score is 1 and therefore we can offer him either aspirin 325 mg daily which have asked him to start after food and if he has any heartburn, may need PPI.  The pathophysiology of atrial fibrillation, various causes, possibility of lone atrial fibrillation, risk of thrombus and embolic stroke, need for anticoagulation depending on etiology and Chads Score was discussed with patient and/or relatives. Rate Vs rhythm control approach was discussed including no difference in two strategies in patients enrolled in Affirm and Race trials. In some patients, afib ablation may be an option and was reviewed.  At this time, unless there are frequent recurrences, I would continue metoprolol.  If there are frequent recurrences, will refer him to electrophysiologist.    2.  Atypical chest tightness  This appears to be atypical.  It is not with exertion.  However he has had a history of anomalous coronary artery.  I would recommend a repeat CT coronary artery to confirm this finding and assess for any severe stenosis or atherosclerosis.  If there is no significant obstructive disease and the anomalous right coronary artery is confirmed with intra-arterial course, we may need a repeat exercise stress nuclear study or echocardiogram to assess for any ischemia.  Metoprolol should be continued as it will decrease exercise-induced significant increase in heart rates which can sometimes compress the right coronary artery when it has an intra-arterial course.  At this time, the patient favors conservative management although if there is exertional chest tightness or severe ischemia in the RCA distribution, he may reconsider his decision.    3.  Hyperlipidemia, continue simvastatin, managed by primary care  physician.    He will return to see my HOANG in follow-up after the CT coronary angiography and likely an exercise stress nuclear study or a stress echocardiogram.    Thank you for allowing us to participate in the care of this nice patient    Sincerely,    Cooper Brown MD          Orders Placed This Encounter   Procedures     CT Angiogram coronary artery     Follow-Up with Cardiac Advanced Practice Provider     EKG 12-lead complete w/read - Clinics (performed today)       Orders Placed This Encounter   Medications     amLODIPine (NORVASC) 10 MG tablet     Sig: Take 10 mg by mouth     cyclobenzaprine (FLEXERIL) 10 MG tablet     Sig: Take 10 mg by mouth     finasteride (PROSCAR) 5 MG tablet     Sig: Take 5 mg by mouth     Omega-3 Fatty Acids (FISH OIL PO)     Sig: Take 2 capsules by mouth     simvastatin (ZOCOR) 20 MG tablet     Sig: Take 20 mg by mouth     aspirin 325 MG tablet     Sig: Take 1 tablet (325 mg) by mouth daily       There are no discontinued medications.      Encounter Diagnoses   Name Primary?     Paroxysmal atrial fibrillation (H)      Benign essential hypertension      Anomalous origin of right coronary artery Yes       CURRENT MEDICATIONS:  Current Outpatient Prescriptions   Medication Sig Dispense Refill     amLODIPine (NORVASC) 10 MG tablet Take 10 mg by mouth       aspirin 325 MG tablet Take 1 tablet (325 mg) by mouth daily       cyclobenzaprine (FLEXERIL) 10 MG tablet Take 10 mg by mouth       finasteride (PROSCAR) 5 MG tablet Take 5 mg by mouth       metoprolol succinate (TOPROL-XL) 50 MG 24 hr tablet Take 1 tablet (50 mg) by mouth daily 30 tablet 1     Omega-3 Fatty Acids (FISH OIL PO) Take 2 capsules by mouth       simvastatin (ZOCOR) 20 MG tablet Take 20 mg by mouth         ALLERGIES   No Known Allergies    PAST MEDICAL HISTORY:  History reviewed. No pertinent past medical history.    PAST SURGICAL HISTORY:  Past Surgical History:   Procedure Laterality Date     CARDIOVERSION  11/06/2018     "AFIB with RVR       FAMILY HISTORY:  Family History   Problem Relation Age of Onset     Myocardial Infarction Father        SOCIAL HISTORY:  Social History     Social History     Marital status:      Spouse name: N/A     Number of children: N/A     Years of education: N/A     Social History Main Topics     Smoking status: Never Smoker     Smokeless tobacco: Never Used     Alcohol use Yes      Comment: once per week     Drug use: None     Sexual activity: Not Asked     Other Topics Concern     None     Social History Narrative       Review of Systems:  Skin:  Negative       Eyes:  Positive for glasses    ENT:  Negative      Respiratory:  Negative       Cardiovascular:    Positive for;chest pain;lightheadedness chest discomfort-occ.  Gastroenterology: Negative      Genitourinary:  Negative      Musculoskeletal:  Positive for joint pain    Neurologic:  Negative      Psychiatric:  Positive for excessive stress    Heme/Lymph/Imm:  Negative      Endocrine:  Negative        Physical Exam:  Vitals: /70  Pulse 57  Ht 1.753 m (5' 9\")  Wt 102.9 kg (226 lb 12.8 oz)  BMI 33.49 kg/m2    Constitutional:  cooperative overweight      Skin:  warm and dry to the touch          Head:  normocephalic        Eyes:  pupils equal and round        Lymph:No Cervical lymphadenopathy present     ENT:  no pallor or cyanosis        Neck:  carotid pulses are full and equal bilaterally        Respiratory:  clear to auscultation         Cardiac: regular rhythm;normal S1 and S2     no presence of murmur                                                   GI:  not assessed this visit        Extremities and Muscular Skeletal:  no edema              Neurological:  no gross motor deficits        Psych:  Alert and Oriented x 3        CC  Deb Herron MD  [facility]  [address] [place] [address]                Thank you for allowing me to participate in the care of your patient.      Sincerely,     Cooper Brown MD "     [facility]    cc:   Deb Herron MD  [facility]  [address] [place] [address]

## 2018-11-20 NOTE — LETTER
11/20/2018    [address] [address]    RE: Jorge Hoda       Dear Colleague,    I had the pleasure of seeing Jorge Drummond in the [facility].    HPI and Plan:   I had the pleasure of seeing Jorge Drummond in cardiology consultation on request of Dr. Herron from the emergency room for new episode of atrial fibrillation.    Jorge is a pleasant 48-year-old male.  He had sudden onset shortness of breath last week at work and while driving home he felt more dizzy and had some chest tightness.  He decided to go to the emergency room and was noted to be in atrial fibrillation.  He started noticing some palpitations.  He was noted to be in rapid atrial fibrillation in the emergency room.  He was given IV diltiazem but the A. fib continued and then was electively cardioverted by the emergency room physician.  He was discharged home in sinus rhythm.    Since then, he has had no palpitations or shortness of breath.  He has occasional chest tightness, nonexertional lasting for a few minutes and subsides on its own.  Since discharge, metoprolol has been added to his regimen.  He was on amlodipine and finasteride.  Amlodipine is for high blood pressure.    He has dyslipidemia is on simvastatin.    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  This was labeled as a malignant anomaly by the radiologist in 2010 in [place] and he saw a cardiologist at [place] at that time.  The cardiologist name was Dr. Stokes.  He was asked to avoid any strenuous exercise.  A stress echo was done which was negative.  Option of conservative management and medical therapy and avoiding strenuous exercise versus surgery was offered but he favored conservative management.    EKG today with sinus rhythm with no ischemic  changes.    Physical exam  See below    Impression  1.  Paroxysmal atrial fibrillation  Patient episode of atrial ablation which was new.  He was electively cardioverted in the emergency room and is now on metoprolol.  His chads vascular score is 1 and therefore we can offer him either aspirin 325 mg daily which have asked him to start after food and if he has any heartburn, may need PPI.  The pathophysiology of atrial fibrillation, various causes, possibility of lone atrial fibrillation, risk of thrombus and embolic stroke, need for anticoagulation depending on etiology and Chads Score was discussed with patient and/or relatives. Rate Vs rhythm control approach was discussed including no difference in two strategies in patients enrolled in Affirm and Race trials. In some patients, afib ablation may be an option and was reviewed.  At this time, unless there are frequent recurrences, I would continue metoprolol.  If there are frequent recurrences, will refer him to electrophysiologist.    2.  Atypical chest tightness  This appears to be atypical.  It is not with exertion.  However he has had a history of anomalous coronary artery.  I would recommend a repeat CT coronary artery to confirm this finding and assess for any severe stenosis or atherosclerosis.  If there is no significant obstructive disease and the anomalous right coronary artery is confirmed with intra-arterial course, we may need a repeat exercise stress nuclear study or echocardiogram to assess for any ischemia.  Metoprolol should be continued as it will decrease exercise-induced significant increase in heart rates which can sometimes compress the right coronary artery when it has an intra-arterial course.  At this time, the patient favors conservative management although if there is exertional chest tightness or severe ischemia in the RCA distribution, he may reconsider his decision.    3.  Hyperlipidemia, continue simvastatin, managed by primary care  physician.    He will return to see my HOANG in follow-up after the CT coronary angiography and likely an exercise stress nuclear study or a stress echocardiogram.    Thank you for allowing us to participate in the care of this nice patient    Sincerely,    Cooper Brown MD          Orders Placed This Encounter   Procedures     CT Angiogram coronary artery     Follow-Up with Cardiac Advanced Practice Provider     EKG 12-lead complete w/read - Clinics (performed today)       Orders Placed This Encounter   Medications     amLODIPine (NORVASC) 10 MG tablet     Sig: Take 10 mg by mouth     cyclobenzaprine (FLEXERIL) 10 MG tablet     Sig: Take 10 mg by mouth     finasteride (PROSCAR) 5 MG tablet     Sig: Take 5 mg by mouth     Omega-3 Fatty Acids (FISH OIL PO)     Sig: Take 2 capsules by mouth     simvastatin (ZOCOR) 20 MG tablet     Sig: Take 20 mg by mouth     aspirin 325 MG tablet     Sig: Take 1 tablet (325 mg) by mouth daily       There are no discontinued medications.      Encounter Diagnoses   Name Primary?     Paroxysmal atrial fibrillation (H)      Benign essential hypertension      Anomalous origin of right coronary artery Yes       CURRENT MEDICATIONS:  Current Outpatient Prescriptions   Medication Sig Dispense Refill     amLODIPine (NORVASC) 10 MG tablet Take 10 mg by mouth       aspirin 325 MG tablet Take 1 tablet (325 mg) by mouth daily       cyclobenzaprine (FLEXERIL) 10 MG tablet Take 10 mg by mouth       finasteride (PROSCAR) 5 MG tablet Take 5 mg by mouth       metoprolol succinate (TOPROL-XL) 50 MG 24 hr tablet Take 1 tablet (50 mg) by mouth daily 30 tablet 1     Omega-3 Fatty Acids (FISH OIL PO) Take 2 capsules by mouth       simvastatin (ZOCOR) 20 MG tablet Take 20 mg by mouth         ALLERGIES   No Known Allergies    PAST MEDICAL HISTORY:  History reviewed. No pertinent past medical history.    PAST SURGICAL HISTORY:  Past Surgical History:   Procedure Laterality Date     CARDIOVERSION  11/06/2018     "AFIB with RVR       FAMILY HISTORY:  Family History   Problem Relation Age of Onset     Myocardial Infarction Father        SOCIAL HISTORY:  Social History     Social History     Marital status:      Spouse name: N/A     Number of children: N/A     Years of education: N/A     Social History Main Topics     Smoking status: Never Smoker     Smokeless tobacco: Never Used     Alcohol use Yes      Comment: once per week     Drug use: None     Sexual activity: Not Asked     Other Topics Concern     None     Social History Narrative       Review of Systems:  Skin:  Negative       Eyes:  Positive for glasses    ENT:  Negative      Respiratory:  Negative       Cardiovascular:    Positive for;chest pain;lightheadedness chest discomfort-occ.  Gastroenterology: Negative      Genitourinary:  Negative      Musculoskeletal:  Positive for joint pain    Neurologic:  Negative      Psychiatric:  Positive for excessive stress    Heme/Lymph/Imm:  Negative      Endocrine:  Negative        Physical Exam:  Vitals: /70  Pulse 57  Ht 1.753 m (5' 9\")  Wt 102.9 kg (226 lb 12.8 oz)  BMI 33.49 kg/m2    Constitutional:  cooperative overweight      Skin:  warm and dry to the touch          Head:  normocephalic        Eyes:  pupils equal and round        Lymph:No Cervical lymphadenopathy present     ENT:  no pallor or cyanosis        Neck:  carotid pulses are full and equal bilaterally        Respiratory:  clear to auscultation         Cardiac: regular rhythm;normal S1 and S2     no presence of murmur                                                   GI:  not assessed this visit        Extremities and Muscular Skeletal:  no edema              Neurological:  no gross motor deficits        Psych:  Alert and Oriented x 3          Thank you for allowing me to participate in the care of your patient.    Sincerely,     Cooper Brown MD     [facility] [place]    "

## 2018-11-20 NOTE — MR AVS SNAPSHOT
"              After Visit Summary   11/20/2018    Jorge Drummond    MRN: [id number]           Patient Information     Date Of Birth          1970        Visit Information        Provider Department      11/20/2018 8:45 AM Cooper Brown MD [facility]        Today's Diagnoses     Anomalous origin of right coronary artery    -  1    Paroxysmal atrial fibrillation (H)        Benign essential hypertension           Follow-ups after your visit        Additional Services     Follow-Up with Cardiac Advanced Practice Provider                 Future tests that were ordered for you today     Open Future Orders        Priority Expected Expires Ordered    CT Angiogram coronary artery Routine 11/27/2018 11/20/2019 11/20/2018    Follow-Up with Cardiac Advanced Practice Provider Routine 11/27/2018 11/20/2019 11/20/2018            Who to contact     If you have questions or need follow up information about today's clinic visit or your schedule please contact [facility] directly at [phone].  Normal or non-critical lab and imaging results will be communicated to you by [facility], letter or phone within 4 business days after the clinic has received the results. If you do not hear from us within 7 days, please contact the clinic through [employer] or phone. If you have a critical or abnormal lab result, we will notify you by phone as soon as possible.  Submit refill requests through [employer] or call your pharmacy and they will forward the refill request to us. Please allow 3 business days for your refill to be completed.          Additional Information About Your Visit        MyChart Information     [employer] lets you send messages to your doctor, view your test results, renew your prescriptions, schedule appointments and more. To sign up, go to [email or web address] . Click on \"Log in\" on the left side of the screen, which will take you to the " "Welcome page. Then click on \"Sign up Now\" on the right side of the page.     You will be asked to enter the access code listed below, as well as some personal information. Please follow the directions to create your username and password.     Your access code is: [email or web address]  Expires: 2019  8:55 PM     Your access code will  in 90 days. If you need help or a new code, please call your [place] clinic or [phone].        Care EveryWhere ID     This is your Care EveryWhere ID. This could be used by other organizations to access your [place] medical records  [id number]        Your Vitals Were     Pulse Height BMI (Body Mass Index)             57 1.753 m (5' 9\") 33.49 kg/m2          Blood Pressure from Last 3 Encounters:   18 121/70   18 138/75    Weight from Last 3 Encounters:   18 102.9 kg (226 lb 12.8 oz)   18 98 kg (216 lb 0.8 oz)              We Performed the Following     EKG 12-lead complete w/read - Clinics (performed today)     Follow-Up with Cardiologist          Today's Medication Changes          These changes are accurate as of 18  9:25 AM.  If you have any questions, ask your nurse or doctor.               Start taking these medicines.        Dose/Directions    aspirin 325 MG tablet   Used for:  Paroxysmal atrial fibrillation (H)   Started by:  Cooper Brown MD        Dose:  325 mg   Take 1 tablet (325 mg) by mouth daily   Refills:  0            Where to get your medicines      Some of these will need a paper prescription and others can be bought over the counter.  Ask your nurse if you have questions.     You don't need a prescription for these medications     aspirin 325 MG tablet                Primary Care Provider Fax #    [facility] [phone]       [facility] [facility] [address]        Equal Access to Services     MELLISSA GARCIA AH: milton Barnett, trip hoskins, " [address] arredondo'aan ah. So [facility] [phone].    ATENCIÓN: Si habla benjamin, tiene a guzmán disposición servicios gratuitos de asistencia lingüística. Isabel chowdhury [phone].    We comply with applicable federal civil rights laws and [place] laws. We do not discriminate on the basis of race, color, national origin, age, disability, sex, sexual orientation, or gender identity.            Thank you!     Thank you for choosing [facility]  for your care. Our goal is always to provide you with excellent care. Hearing back from our patients is one way we can continue to improve our services. Please take a few minutes to complete the written survey that you may receive in the mail after your visit with us. Thank you!             Your Updated Medication List - Protect others around you: Learn how to safely use, store and throw away your medicines at www.disposemymeds.org.          This list is accurate as of 11/20/18  9:25 AM.  Always use your most recent med list.                   Brand Name Dispense Instructions for use Diagnosis    amLODIPine 10 MG tablet    NORVASC     Take 10 mg by mouth        aspirin 325 MG tablet      Take 1 tablet (325 mg) by mouth daily    Paroxysmal atrial fibrillation (H)       cyclobenzaprine 10 MG tablet    FLEXERIL     Take 10 mg by mouth        finasteride 5 MG tablet    PROSCAR     Take 5 mg by mouth        FISH OIL PO      Take 2 capsules by mouth        metoprolol succinate 50 MG 24 hr tablet    TOPROL-XL    30 tablet    Take 1 tablet (50 mg) by mouth daily        simvastatin 20 MG tablet    ZOCOR     Take 20 mg by mouth

## 2018-11-20 NOTE — PROGRESS NOTES
HPI and Plan:   I had the pleasure of seeing Jorge Drummond in cardiology consultation on request of Dr. Herron from the emergency room for new episode of atrial fibrillation.    Jorge is a pleasant 48-year-old male.  He had sudden onset shortness of breath last week at work and while driving home he felt more dizzy and had some chest tightness.  He decided to go to the emergency room and was noted to be in atrial fibrillation.  He started noticing some palpitations.  He was noted to be in rapid atrial fibrillation in the emergency room.  He was given IV diltiazem but the A. fib continued and then was electively cardioverted by the emergency room physician.  He was discharged home in sinus rhythm.    Since then, he has had no palpitations or shortness of breath.  He has occasional chest tightness, nonexertional lasting for a few minutes and subsides on its own.  Since discharge, metoprolol has been added to his regimen.  He was on amlodipine and finasteride.  Amlodipine is for high blood pressure.    He has dyslipidemia is on simvastatin.    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  This was labeled as a malignant anomaly by the radiologist in 2010 in [place] and he saw a cardiologist at [place] at that time.  The cardiologist name was Dr. Stokes.  He was asked to avoid any strenuous exercise.  A stress echo was done which was negative.  Option of conservative management and medical therapy and avoiding strenuous exercise versus surgery was offered but he favored conservative management.    EKG today with sinus rhythm with no ischemic changes.    Physical exam  See below    Impression  1.  Paroxysmal atrial fibrillation  Patient episode of atrial ablation which was new.  He was electively cardioverted in the emergency room and is now on metoprolol.  His chads vascular score is 1 and  therefore we can offer him either aspirin 325 mg daily which have asked him to start after food and if he has any heartburn, may need PPI.  The pathophysiology of atrial fibrillation, various causes, possibility of lone atrial fibrillation, risk of thrombus and embolic stroke, need for anticoagulation depending on etiology and Chads Score was discussed with patient and/or relatives. Rate Vs rhythm control approach was discussed including no difference in two strategies in patients enrolled in Aff[employer] and Race trials. In some patients, afib ablation may be an option and was reviewed.  At this time, unless there are frequent recurrences, I would continue metoprolol.  If there are frequent recurrences, will refer him to electrophysiologist.    2.  Atypical chest tightness  This appears to be atypical.  It is not with exertion.  However he has had a history of anomalous coronary artery.  I would recommend a repeat CT coronary artery to confirm this finding and assess for any severe stenosis or atherosclerosis.  If there is no significant obstructive disease and the anomalous right coronary artery is confirmed with intra-arterial course, we may need a repeat exercise stress nuclear study or echocardiogram to assess for any ischemia.  Metoprolol should be continued as it will decrease exercise-induced significant increase in heart rates which can sometimes compress the right coronary artery when it has an intra-arterial course.  At this time, the patient favors conservative management although if there is exertional chest tightness or severe ischemia in the RCA distribution, he may reconsider his decision.    3.  Hyperlipidemia, continue simvastatin, managed by primary care physician.    He will return to see my HOANG in follow-up after the CT coronary angiography and likely an exercise stress nuclear study or a stress echocardiogram.    Thank you for allowing us to participate in the care of this nice  patient    Sincerely,    Cooper Brown MD          Orders Placed This Encounter   Procedures     CT Angiogram coronary artery     Follow-Up with Cardiac Advanced Practice Provider     EKG 12-lead complete w/read - Clinics (performed today)       Orders Placed This Encounter   Medications     amLODIPine (NORVASC) 10 MG tablet     Sig: Take 10 mg by mouth     cyclobenzaprine (FLEXERIL) 10 MG tablet     Sig: Take 10 mg by mouth     finasteride (PROSCAR) 5 MG tablet     Sig: Take 5 mg by mouth     Omega-3 Fatty Acids (FISH OIL PO)     Sig: Take 2 capsules by mouth     simvastatin (ZOCOR) 20 MG tablet     Sig: Take 20 mg by mouth     aspirin 325 MG tablet     Sig: Take 1 tablet (325 mg) by mouth daily       There are no discontinued medications.      Encounter Diagnoses   Name Primary?     Paroxysmal atrial fibrillation (H)      Benign essential hypertension      Anomalous origin of right coronary artery Yes       CURRENT MEDICATIONS:  Current Outpatient Prescriptions   Medication Sig Dispense Refill     amLODIPine (NORVASC) 10 MG tablet Take 10 mg by mouth       aspirin 325 MG tablet Take 1 tablet (325 mg) by mouth daily       cyclobenzaprine (FLEXERIL) 10 MG tablet Take 10 mg by mouth       finasteride (PROSCAR) 5 MG tablet Take 5 mg by mouth       metoprolol succinate (TOPROL-XL) 50 MG 24 hr tablet Take 1 tablet (50 mg) by mouth daily 30 tablet 1     Omega-3 Fatty Acids (FISH OIL PO) Take 2 capsules by mouth       simvastatin (ZOCOR) 20 MG tablet Take 20 mg by mouth         ALLERGIES   No Known Allergies    PAST MEDICAL HISTORY:  History reviewed. No pertinent past medical history.    PAST SURGICAL HISTORY:  Past Surgical History:   Procedure Laterality Date     CARDIOVERSION  11/06/2018    AFIB with RVR       FAMILY HISTORY:  Family History   Problem Relation Age of Onset     Myocardial Infarction Father        SOCIAL HISTORY:  Social History     Social History     Marital status:      Spouse name: N/A      "Number of children: N/A     Years of education: N/A     Social History Main Topics     Smoking status: Never Smoker     Smokeless tobacco: Never Used     Alcohol use Yes      Comment: once per week     Drug use: None     Sexual activity: Not Asked     Other Topics Concern     None     Social History Narrative       Review of Systems:  Skin:  Negative       Eyes:  Positive for glasses    ENT:  Negative      Respiratory:  Negative       Cardiovascular:    Positive for;chest pain;lightheadedness chest discomfort-occ.  Gastroenterology: Negative      Genitourinary:  Negative      Musculoskeletal:  Positive for joint pain    Neurologic:  Negative      Psychiatric:  Positive for excessive stress    Heme/Lymph/Imm:  Negative      Endocrine:  Negative        Physical Exam:  Vitals: /70  Pulse 57  Ht 1.753 m (5' 9\")  Wt 102.9 kg (226 lb 12.8 oz)  BMI 33.49 kg/m2    Constitutional:  cooperative overweight      Skin:  warm and dry to the touch          Head:  normocephalic        Eyes:  pupils equal and round        Lymph:No Cervical lymphadenopathy present     ENT:  no pallor or cyanosis        Neck:  carotid pulses are full and equal bilaterally        Respiratory:  clear to auscultation         Cardiac: regular rhythm;normal S1 and S2     no presence of murmur                                                   GI:  not assessed this visit        Extremities and Muscular Skeletal:  no edema              Neurological:  no gross motor deficits        Psych:  Alert and Oriented x 3        CC  Deb Herron MD  [facility]  [address] [place] [address]              "

## 2018-11-28 ENCOUNTER — HOSPITAL ENCOUNTER (OUTPATIENT)
Dept: CARDIOLOGY | Facility: CLINIC | Age: 48
Discharge: HOME | End: 2018-11-28
Attending: INTERNAL MEDICINE | Admitting: INTERNAL MEDICINE
Payer: OTHER GOVERNMENT

## 2018-11-28 VITALS — DIASTOLIC BLOOD PRESSURE: 69 MMHG | SYSTOLIC BLOOD PRESSURE: 125 MMHG | HEART RATE: 60 BPM

## 2018-11-28 DIAGNOSIS — I48.0 PAROXYSMAL ATRIAL FIBRILLATION (H): ICD-10-CM

## 2018-11-28 DIAGNOSIS — Q24.5 ANOMALOUS ORIGIN OF RIGHT CORONARY ARTERY: ICD-10-CM

## 2018-11-28 PROCEDURE — 75574 CT ANGIO HRT W/3D IMAGE: CPT | Mod: 26 | Performed by: INTERNAL MEDICINE

## 2018-11-28 PROCEDURE — 25000128 H RX IP 250 OP 636: Performed by: INTERNAL MEDICINE

## 2018-11-28 PROCEDURE — 25000132 ZZH RX MED GY IP 250 OP 250 PS 637: Performed by: INTERNAL MEDICINE

## 2018-11-28 PROCEDURE — 75574 CT ANGIO HRT W/3D IMAGE: CPT

## 2018-11-28 RX ORDER — DIPHENHYDRAMINE HYDROCHLORIDE 50 MG/ML
25-50 INJECTION INTRAMUSCULAR; INTRAVENOUS
Status: DISCONTINUED | OUTPATIENT
Start: 2018-11-28 | End: 2018-11-29 | Stop reason: HOSPADM

## 2018-11-28 RX ORDER — ONDANSETRON 2 MG/ML
4 INJECTION INTRAMUSCULAR; INTRAVENOUS
Status: DISCONTINUED | OUTPATIENT
Start: 2018-11-28 | End: 2018-11-29 | Stop reason: HOSPADM

## 2018-11-28 RX ORDER — NITROGLYCERIN 0.4 MG/1
0.4 TABLET SUBLINGUAL
Status: DISCONTINUED | OUTPATIENT
Start: 2018-11-28 | End: 2018-11-29 | Stop reason: HOSPADM

## 2018-11-28 RX ORDER — METHYLPREDNISOLONE SODIUM SUCCINATE 125 MG/2ML
125 INJECTION, POWDER, LYOPHILIZED, FOR SOLUTION INTRAMUSCULAR; INTRAVENOUS
Status: DISCONTINUED | OUTPATIENT
Start: 2018-11-28 | End: 2018-11-29 | Stop reason: HOSPADM

## 2018-11-28 RX ORDER — METOPROLOL TARTRATE 1 MG/ML
5-15 INJECTION, SOLUTION INTRAVENOUS
Status: DISCONTINUED | OUTPATIENT
Start: 2018-11-28 | End: 2018-11-29 | Stop reason: HOSPADM

## 2018-11-28 RX ORDER — ACYCLOVIR 200 MG/1
0-1 CAPSULE ORAL
Status: DISCONTINUED | OUTPATIENT
Start: 2018-11-28 | End: 2018-11-29 | Stop reason: HOSPADM

## 2018-11-28 RX ORDER — DIPHENHYDRAMINE HCL 25 MG
25 CAPSULE ORAL
Status: DISCONTINUED | OUTPATIENT
Start: 2018-11-28 | End: 2018-11-29 | Stop reason: HOSPADM

## 2018-11-28 RX ORDER — METOPROLOL TARTRATE 50 MG
50-100 TABLET ORAL
Status: COMPLETED | OUTPATIENT
Start: 2018-11-28 | End: 2018-11-28

## 2018-11-28 RX ORDER — IOPAMIDOL 755 MG/ML
50-150 INJECTION, SOLUTION INTRAVASCULAR ONCE
Status: COMPLETED | OUTPATIENT
Start: 2018-11-28 | End: 2018-11-28

## 2018-11-28 RX ADMIN — METOPROLOL TARTRATE 100 MG: 50 TABLET ORAL at 10:51

## 2018-11-28 RX ADMIN — NITROGLYCERIN 0.4 MG: 0.4 TABLET SUBLINGUAL at 11:56

## 2018-11-28 RX ADMIN — IOPAMIDOL 115 ML: 755 INJECTION, SOLUTION INTRAVENOUS at 12:17

## 2018-11-30 ENCOUNTER — OFFICE VISIT (OUTPATIENT)
Dept: CARDIOLOGY | Facility: CLINIC | Age: 48
End: 2018-11-30
Attending: INTERNAL MEDICINE
Payer: OTHER GOVERNMENT

## 2018-11-30 VITALS
BODY MASS INDEX: 33.87 KG/M2 | HEIGHT: 69 IN | HEART RATE: 64 BPM | DIASTOLIC BLOOD PRESSURE: 80 MMHG | SYSTOLIC BLOOD PRESSURE: 164 MMHG | WEIGHT: 228.7 LBS

## 2018-11-30 DIAGNOSIS — I48.0 PAROXYSMAL ATRIAL FIBRILLATION (H): ICD-10-CM

## 2018-11-30 DIAGNOSIS — Q24.5 ANOMALOUS ORIGIN OF RIGHT CORONARY ARTERY: Primary | ICD-10-CM

## 2018-11-30 DIAGNOSIS — I10 BENIGN ESSENTIAL HYPERTENSION: ICD-10-CM

## 2018-11-30 DIAGNOSIS — R07.89 ATYPICAL CHEST PAIN: ICD-10-CM

## 2018-11-30 PROCEDURE — 99214 OFFICE O/P EST MOD 30 MIN: CPT | Performed by: PHYSICIAN ASSISTANT

## 2018-11-30 RX ORDER — LISINOPRIL 5 MG/1
5 TABLET ORAL DAILY
Qty: 30 TABLET | Refills: 1 | Status: SHIPPED | OUTPATIENT
Start: 2018-11-30 | End: 2019-01-03

## 2018-11-30 NOTE — PROGRESS NOTES
Cardiology Clinic Progress Note    Jorge Drummond MRN# [id number]   YOB: 1970 Age: 48 year old          Assessment and Plan:     In summary, Jorge Drummond presents today for follow-up of recent CTA showing mild, non-obstructive disease of the LAD and confirms presence of anomalous RCA with an intra-atrial course. He's feeling well and has had no symptoms of recurrent PAF since his admission two weeks ago.  He was having some intermittent chest discomfort following discharge but this has since resolved. He's currently limiting his aerobic exercise but hopes to get back to his routine regimen. His BP is elevated in clinic today, at 164/80 mmHg at end of visit per my re-check.      1. Mild CAD in LAD with CT score of 18.6  2. Atypical CP  3. Anomalous RCA with intra-atrial course  4. Symptomatic PAF s/p DCCV, on aspirin 325 with a [id number] of 1 - on BB and high dose aspirin  5. HTN, uncontrolled on Norvasc 10 and Toprol 50  6. HLP - on zocor 10  7. Pulmonary nodules incidentally noted on CT    Plan:  - Will arrange a stress MPI, holding beta blocker for 48 hours prior, to assess perfusion in the region of his anamolous RCA with exercise.  - Despite the lack of evidence supporting association between caffeine intake and AF, given the proximity of his regimen of using C4/pre-workout to his recent admission, I've recommended avoidance of pre-workout and equivalents. He's agreeable to this.   - Continue management of mild CAD with aspirin and statin.  - Will start low-dose lisinopril for uncontrolled HTN today. Ideally, will be able to gain better control by simply increasing Toprol in the future, but as upcoming testing requires temporary BB hold I will put him on this at least for the short-term.   - Return to see me in one week, with BMP prior.   - [facility]  - Advised to f/u with PCP regarding lung nodules.         History of Presenting Illness:      Jorge  "Hoda is a pleasant 48 year old patient of  Dr. Brown who presents today for follow up of recent CTA.    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 He was recently admitted for an episode of symptomatic atrial fibrillation requiring cardioversion. He was discharged home in [place] and metoprolol was added to his regimen. When seen in consultation by Dr. Brown he'd noted some intermittent chest discomfort since discharge. A CTA was ordered to assess for significant coronary artery disease as well as the course of his RCA. These results are detailed below.     Today, he feels well overall. Patient denies recurrent chest pain, shortness of breath, PND, orthopnea, edema, palpitations, near syncope or syncope. He used to run frequently but due to some chronic issues with his ankle and spine has recently been exercising via alternate forms, although is still routinely active at baseline. He'd been using C4 pre-workout prior to his workouts (elliptical alternating with short treadmill runs) during the lunch hour prior to his episode of atrial fib. Since that time, he's been doing some intermittent weight lifting but has been avoiding aerobic exercise. He works in the [employer] department at the [employer]. He states he did spend about 6 months in [place] when he was in the , and used some cigars then. He otherwise has no tobacco history.          Review of Systems:     12-pt ROS is negative except for as noted in the HPI.          Physical Exam:     Vitals: /74  Pulse 64  Ht 1.753 m (5' 9\")  Wt 103.7 kg (228 lb 11.2 oz)  BMI 33.77 kg/m2  Wt Readings from Last 3 Encounters:   11/30/18 103.7 kg (228 lb 11.2 oz)   11/20/18 102.9 kg (226 lb 12.8 oz)   11/16/18 98 kg (216 lb 0.8 oz)       Constitutional:  Patient is pleasant, alert, cooperative, and in NAD.  HEENT:  " NCAT. PERRLA. EOM's intact. No masses or thyromegaly. Teeth in normal repair.   Neck:  CVP appears normal. No carotid bruits.   Chest:  Non-tender, normal A/P diameter.   Pulmonary: Normal respiratory effort. CTAB.   Cardiac: RRR, normal S1/S2, no S3/S4, no murmur or rub.   Abdomen:  Non-tender abdomen with normoactive bowel sounds, no hepatosplenomegaly appreciated.   Vascular: Pulses in the upper and lower extremities are 2+ and equal bilaterally.  Extremities: No edema, erythema, cyanosis or tenderness appreciated.  Skin:  No rashes or lesions appreciated.   Neurological:  No gross motor or sensory deficits.   Psych: Appropriate affect.        Data:     Cardiac Diagnostics reviewed:  Type Date Result   Stress echo 9/2/10 FINAL IMPRESSION  1. Image quality was good with contrast.  2. Normal stress exercise with adequate heart rate and work load.  3. Excellent exercise capacity.  4. No evidence of inducible ischemia.   The patient underwent stress echo by nurse supervised Addi protocol. He achieved stage 5 of Addi protocol. Maximal heart rate achieved  was 169 which was more than 85% of maximal predicted heart rate. Work load was 16.2 METS.   CTA 11/28/18 IMPRESSION:  1. Total Agatston score 18.61, placing the patient in the 72nd  percentile when compared to age and gender matched control group.  2. Mild nonobstructive coronary artery disease seen in the left  anterior descending artery.  Luminal irregularities seen in the  circumflex artery only  right coronary artery is again seen arising  from the left coronary cusp. The ostium of this artery appears patent.  The artery takes off at a fairly acute angle (20 degrees  approximately) and travels for a short distance between the aorta and  the right ventricular outflow tract before resuming is normal course.  Luminal irregularities only present in this vessel.  IMPRESSION:   1. 2 mm pulmonary nodule in the superior aspect of the left lower  lobe, recommend  follow-up as below.  2. Sequela of prior granulomatous disease.    8/31/10 IMPRESSION:  The right coronary artery arises from the left coronary sinus and runs between the right ventricular outflow tract and the aorta.  This represents a malignant coronary artery anomaly.    No coronary artery atherosclerotic disease. Calcium score = 0.      Recent Labs   Lab Test  11/16/18   1517   TSH  1.44       Lab Results   Component Value Date    WBC 8.7 11/16/2018    RBC 5.22 11/16/2018    HGB 15.2 11/16/2018    HCT 43.5 11/16/2018    MCV 83 11/16/2018    MCH 29.1 11/16/2018    MCHC 34.9 11/16/2018    RDW 12.4 11/16/2018     11/16/2018       Lab Results   Component Value Date     11/16/2018    POTASSIUM 3.5 11/16/2018    CHLORIDE 105 11/16/2018    CO2 27 11/16/2018    ANIONGAP 6 11/16/2018     (H) 11/16/2018    BUN 17 11/16/2018    CR 1.14 11/16/2018    GFRESTIMATED 68 11/16/2018    GFRESTBLACK 82 11/16/2018    FRANCOIS 9.1 11/16/2018      No results found for: AST, ALT    No results found for: A1C    No results found for: INR        Problem List:     Patient Active Problem List   Diagnosis     Benign essential hypertension     Coronary artery anomaly     Paroxysmal atrial fibrillation (H)     Anomalous origin of right coronary artery           Medications:     Current Outpatient Prescriptions   Medication Sig Dispense Refill     aspirin 325 MG tablet Take 1 tablet (325 mg) by mouth daily       cyclobenzaprine (FLEXERIL) 10 MG tablet Take 5 mg by mouth        finasteride (PROSCAR) 5 MG tablet Take 5 mg by mouth       Lido-Capsaicin-Men-Methyl Sal (MEDI-PATCH-LIDOCAINE EX) Externally apply topically as needed       lisinopril (PRINIVIL/ZESTRIL) 5 MG tablet Take 1 tablet (5 mg) by mouth daily 30 tablet 1     metoprolol succinate (TOPROL-XL) 50 MG 24 hr tablet Take 1 tablet (50 mg) by mouth daily 30 tablet 1     Omega-3 Fatty Acids (FISH OIL PO) Take 2 capsules by mouth       simvastatin (ZOCOR) 20 MG tablet Take  10 mg by mouth        amLODIPine (NORVASC) 10 MG tablet Take 10 mg by mouth             Past Medical History:   History reviewed. No pertinent past medical history.  Past Surgical History:   Procedure Laterality Date     CARDIOVERSION  11/06/2018    AFIB with RVR     Family History   Problem Relation Age of Onset     Myocardial Infarction Father      Social History     Social History     Marital status:      Spouse name: N/A     Number of children: N/A     Years of education: N/A     Occupational History     Not on file.     Social History Main Topics     Smoking status: Never Smoker     Smokeless tobacco: Never Used     Alcohol use Yes      Comment: once per week     Drug use: Not on file     Sexual activity: Not on file     Other Topics Concern     Not on file     Social History Narrative           Allergies:   Review of patient's allergies indicates no known allergies.      Mimi Contreras PA-C  [facility]  Pager: [phone]

## 2018-11-30 NOTE — MR AVS SNAPSHOT
After Visit Summary   11/30/2018    Jorge Drummond    MRN: [id number]           Patient Information     Date Of Birth          1970        Visit Information        Provider Department      11/30/2018 2:30 PM Mimi Contreras PA-C [facility]   [place]        Today's Diagnoses     Anomalous origin of right coronary artery    -  1    Paroxysmal atrial fibrillation (H)        Atypical chest pain        Benign essential hypertension           Follow-ups after your visit        Additional Services     Follow-Up with Cardiac Advanced Practice Provider                 Your next 10 appointments already scheduled     Dec 05, 2018  8:00 AM CST   NM SH CV MPI MULT RST ST 1 DAY with SCINM1   [facility] CV Nuclear Medicine (Cardiovascular Imaging at [facility])    [address]  [address]  [place] [address] [phone]           How do I prepare for my exam? (Food and drink instructions) Day 1 & Day 2: Stop all caffeine 12 hours before the test. This includes coffee, tea, soda pop, chocolate and certain medicines (such as Anacin, Excedrin and NoDoz). Also avoid decaf coffee and tea, as these contain small amounts of caffeine. Stop eating 4 hours before the test. You may drink water.  How do I prepare for my exam? (Other instructions) You may need to stop some medicines before the test. Follow your doctor s orders. Day 1 & Day 2: *If you take a beta blocker: Do not take your beta-blocker on the day before your test, unless specifically told to by your doctor. And do not take it on the day of your test. Bring it with you to take after the test.  *If you take Aggrenox or dipyridamole (Persantine, Permole), stop taking it 48 hours before your test. *If you take Viagra, Cialis or Levitra, stop taking it 48 hours before your test. *If you take theophylline or aminophylline, stop taking it 12 hours before your test.  For patients with diabetes:  *If you take insulin, call your diabetes care team. Ask if you should take a 1/2 dose the morning of your test. *If you take diabetes medicine by mouth, don t take it on the morning of your test. Bring it with you to take after the test. (If you have questions, call your diabetes care team.)  *Do not take nitrates on the day of your test. Do not wear your Nitro-Patch. *No alcohol, smoking or other tobacco for 12 hours before the test.  What should I wear: Please wear a loose two-piece outfit. If you will have an exercise test, bring rubber-soled walking shoes.  How long does the exam take: *This test can take 1-2 days.* ONE day exam: Allow 3-4 hours for test. IF TWO day exam: Allow  minutes PER day for test.  What should I bring: Please bring a list of your medicines (including vitamins, minerals and over-the-counter drugs). Leave your valuables at home.  Do I need a :  No  is needed.  What do I need to tell my doctor? When you arrive, please tell us if you: * Have diabetes * Are breastfeeding * May be pregnant * Have a pacemaker of ICD (implantable defibrillator).  What should I do after the exam: No restrictions, You may resume normal activities.  What is this test: Your doctor has ordered a nuclear stress test to check how well blood is flowing through your heart. You will either exercise or take a medicine that mimics exercise; we will watch your heart.  Who should I call with questions: If you have any questions, please call the Imaging Department where you will have your exam. Directions, parking instructions, and other information is available on our website, [email or web address].            Dec 07, 2018  3:45 PM CST   LAB with RU LAB   [facility] HEART AT [place] ([facility])    [address] [address] [phone]           Please do not eat 10-12 hours before your appointment if you are coming in fasting for labs on lipids,  "cholesterol, or glucose (sugar). This does not apply to pregnant women. Water, hot tea and black coffee (with nothing added) are okay. Do not drink other fluids, diet soda or chew gum.            2019  3:10 PM CST   Return Visit with Mimi Contreras PA-C   [facility] ([facility])    [address] [place], [place] [address]  [facility] [address] [phone] OPT 2              Who to contact     If you have questions or need follow up information about today's clinic visit or your schedule please contact [facility] directly at [phone].  Normal or non-critical lab and imaging results will be communicated to you by Instacoverhart, letter or phone within 4 business days after the clinic has received the results. If you do not hear from us within 7 days, please contact the clinic through Instacoverhart or phone. If you have a critical or abnormal lab result, we will notify you by phone as soon as possible.  Submit refill requests through [employer] or call your pharmacy and they will forward the refill request to us. Please allow 3 business days for your refill to be completed.          Additional Information About Your Visit        Instacoverhart Information     [employer] lets you send messages to your doctor, view your test results, renew your prescriptions, schedule appointments and more. To sign up, go to [email or web address] . Click on \"Log in\" on the left side of the screen, which will take you to the Welcome page. Then click on \"Sign up Now\" on the right side of the page.     You will be asked to enter the access code listed below, as well as some personal information. Please follow the directions to create your username and password.     Your access code is: [email or web address]  Expires: 2019  8:55 PM     Your access code will  in 90 days. If you need help or a new code, please call your [place] clinic or [phone].        Care " "EveryWhere ID     This is your Care EveryWhere ID. This could be used by other organizations to access your [place] medical records  [id number]        Your Vitals Were     Pulse Height BMI (Body Mass Index)             64 1.753 m (5' 9\") 33.77 kg/m2          Blood Pressure from Last 3 Encounters:   11/30/18 164/80   11/28/18 125/69   11/20/18 121/70    Weight from Last 3 Encounters:   11/30/18 103.7 kg (228 lb 11.2 oz)   11/20/18 102.9 kg (226 lb 12.8 oz)   11/16/18 98 kg (216 lb 0.8 oz)              We Performed the Following     Follow-Up with Cardiac Advanced Practice Provider          Today's Medication Changes          These changes are accurate as of 11/30/18 11:59 PM.  If you have any questions, ask your nurse or doctor.               Start taking these medicines.        Dose/Directions    lisinopril 5 MG tablet   Commonly known as:  PRINIVIL/ZESTRIL   Used for:  Benign essential hypertension   Started by:  Mimi Contreras PA-C        Dose:  5 mg   Take 1 tablet (5 mg) by mouth daily   Quantity:  30 tablet   Refills:  1            Where to get your medicines      Some of these will need a paper prescription and others can be bought over the counter.  Ask your nurse if you have questions.     Bring a paper prescription for each of these medications     lisinopril 5 MG tablet                Primary Care Provider Fax #    [facility] [phone]       [address] [place] [address]        Equal Access to Services     MELLISSA GARCIA AH: Michaela byers Somj, waaxda luqadaha, qaybta kaalmada adeegyada, waxorquidea padma hare. So [facility] [phone].    ATENCIÓN: Si habla español, tiene a guzmán disposición servicios gratuitos de asistencia lingüística. Llame al [phone].    We comply with applicable federal civil rights laws and [place] laws. We do not discriminate on the basis of race, color, national origin, age, disability, sex, sexual " orientation, or gender identity.            Thank you!     Thank you for choosing [facility] HEART [facility]  for your care. Our goal is always to provide you with excellent care. Hearing back from our patients is one way we can continue to improve our services. Please take a few minutes to complete the written survey that you may receive in the mail after your visit with us. Thank you!             Your Updated Medication List - Protect others around you: Learn how to safely use, store and throw away your medicines at www.disposemymeds.org.          This list is accurate as of 11/30/18 11:59 PM.  Always use your most recent med list.                   Brand Name Dispense Instructions for use Diagnosis    amLODIPine 10 MG tablet    NORVASC     Take 10 mg by mouth        aspirin 325 MG tablet    ASA     Take 1 tablet (325 mg) by mouth daily    Paroxysmal atrial fibrillation (H)       cyclobenzaprine 10 MG tablet    FLEXERIL     Take 5 mg by mouth        finasteride 5 MG tablet    PROSCAR     Take 5 mg by mouth        FISH OIL PO      Take 2 capsules by mouth        lisinopril 5 MG tablet    PRINIVIL/ZESTRIL    30 tablet    Take 1 tablet (5 mg) by mouth daily    Benign essential hypertension       MEDI-PATCH-LIDOCAINE EX      Externally apply topically as needed        metoprolol succinate ER 50 MG 24 hr tablet    TOPROL-XL    30 tablet    Take 1 tablet (50 mg) by mouth daily        simvastatin 20 MG tablet    ZOCOR     Take 10 mg by mouth

## 2018-11-30 NOTE — LETTER
11/30/2018    [address] [address]    RE: Jorge Drummond       Dear Colleague,    I had the pleasure of seeing Jorge Drummond in the [facility].    Cardiology Clinic Progress Note    Jorge Drummond MRN# [id number]   YOB: 1970 Age: 48 year old          Assessment and Plan:     In summary, Jorge Drummond presents today for follow-up of recent CTA showing mild, non-obstructive disease of the LAD and confirms presence of anomalous RCA with an intra-atrial course. He's feeling well and has had no symptoms of recurrent PAF since his admission two weeks ago.  He was having some intermittent chest discomfort following discharge but this has since resolved. He's currently limiting his aerobic exercise but hopes to get back to his routine regimen. His BP is elevated in clinic today, at 164/80 mmHg at end of visit per my re-check.      1. Mild CAD in LAD with CT score of 18.6  2. Atypical CP  3. Anomalous RCA with intra-atrial course  4. Symptomatic PAF s/p DCCV, on aspirin 325 with a [id number] of 1 - on BB and high dose aspirin  5. HTN, uncontrolled on Norvasc 10 and Toprol 50  6. HLP - on zocor 10  7. Pulmonary nodules incidentally noted on CT    Plan:  - Will arrange a stress MPI, holding beta blocker for 48 hours prior, to assess perfusion in the region of his anamolous RCA with exercise.  - Despite the lack of evidence supporting association between caffeine intake and AF, given the proximity of his regimen of using C4/pre-workout to his recent admission, I've recommended avoidance of pre-workout and equivalents. He's agreeable to this.   - Continue management of mild CAD with aspirin and statin.  - Will start low-dose lisinopril for uncontrolled HTN today. Ideally, will be able to gain better control by simply increasing Toprol in the future, but as upcoming testing requires temporary BB hold I will put  "him on this at least for the short-term.   - Return to see me in one week, with BMP prior.   - [facility]  - Advised to f/u with PCP regarding lung nodules.         History of Presenting Illness:      Jorge Drummond is a pleasant 48 year old patient of  Dr. Brown who presents today for follow up of recent CTA.    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 He was recently admitted for an episode of symptomatic atrial fibrillation requiring cardioversion. He was discharged home in [place] and metoprolol was added to his regimen. When seen in consultation by Dr. Brown he'd noted some intermittent chest discomfort since discharge. A CTA was ordered to assess for significant coronary artery disease as well as the course of his RCA. These results are detailed below.     Today, he feels well overall. Patient denies recurrent chest pain, shortness of breath, PND, orthopnea, edema, palpitations, near syncope or syncope. He used to run frequently but due to some chronic issues with his ankle and spine has recently been exercising via alternate forms, although is still routinely active at baseline. He'd been using C4 pre-workout prior to his workouts (elliptical alternating with short treadmill runs) during the lunch hour prior to his episode of atrial fib. Since that time, he's been doing some intermittent weight lifting but has been avoiding aerobic exercise. He works in the [employer] department at the [employer]. He states he did spend about 6 months in [place] when he was in the , and used some cigars then. He otherwise has no tobacco history.          Review of Systems:     12-pt ROS is negative except for as noted in the HPI.          Physical Exam:     Vitals: /74  Pulse 64  Ht 1.753 m (5' 9\")  Wt 103.7 kg (228 lb 11.2 oz)  " BMI 33.77 kg/m2  Wt Readings from Last 3 Encounters:   11/30/18 103.7 kg (228 lb 11.2 oz)   11/20/18 102.9 kg (226 lb 12.8 oz)   11/16/18 98 kg (216 lb 0.8 oz)       Constitutional:  Patient is pleasant, alert, cooperative, and in NAD.  HEENT:  NCAT. PERRLA. EOM's intact. No masses or thyromegaly. Teeth in normal repair.   Neck:  CVP appears normal. No carotid bruits.   Chest:  Non-tender, normal A/P diameter.   Pulmonary: Normal respiratory effort. CTAB.   Cardiac: RRR, normal S1/S2, no S3/S4, no murmur or rub.   Abdomen:  Non-tender abdomen with normoactive bowel sounds, no hepatosplenomegaly appreciated.   Vascular: Pulses in the upper and lower extremities are 2+ and equal bilaterally.  Extremities: No edema, erythema, cyanosis or tenderness appreciated.  Skin:  No rashes or lesions appreciated.   Neurological:  No gross motor or sensory deficits.   Psych: Appropriate affect.        Data:     Cardiac Diagnostics reviewed:  Type Date Result   Stress echo 9/2/10 FINAL IMPRESSION  1. Image quality was good with contrast.  2. Normal stress exercise with adequate heart rate and work load.  3. Excellent exercise capacity.  4. No evidence of inducible ischemia.   The patient underwent stress echo by nurse supervised Addi protocol. He achieved stage 5 of Addi protocol. Maximal heart rate achieved  was 169 which was more than 85% of maximal predicted heart rate. Work load was 16.2 METS.   CTA 11/28/18 IMPRESSION:  1. Total Agatston score 18.61, placing the patient in the 72nd  percentile when compared to age and gender matched control group.  2. Mild nonobstructive coronary artery disease seen in the left  anterior descending artery.  Luminal irregularities seen in the  circumflex artery only  right coronary artery is again seen arising  from the left coronary cusp. The ostium of this artery appears patent.  The artery takes off at a fairly acute angle (20 degrees  approximately) and travels for a short distance  between the aorta and  the right ventricular outflow tract before resuming is normal course.  Luminal irregularities only present in this vessel.  IMPRESSION:   1. 2 mm pulmonary nodule in the superior aspect of the left lower  lobe, recommend follow-up as below.  2. Sequela of prior granulomatous disease.    8/31/10 IMPRESSION:  The right coronary artery arises from the left coronary sinus and runs between the right ventricular outflow tract and the aorta.  This represents a malignant coronary artery anomaly.    No coronary artery atherosclerotic disease. Calcium score = 0.      Recent Labs   Lab Test  11/16/18   1517   TSH  1.44       Lab Results   Component Value Date    WBC 8.7 11/16/2018    RBC 5.22 11/16/2018    HGB 15.2 11/16/2018    HCT 43.5 11/16/2018    MCV 83 11/16/2018    MCH 29.1 11/16/2018    MCHC 34.9 11/16/2018    RDW 12.4 11/16/2018     11/16/2018       Lab Results   Component Value Date     11/16/2018    POTASSIUM 3.5 11/16/2018    CHLORIDE 105 11/16/2018    CO2 27 11/16/2018    ANIONGAP 6 11/16/2018     (H) 11/16/2018    BUN 17 11/16/2018    CR 1.14 11/16/2018    GFRESTIMATED 68 11/16/2018    GFRESTBLACK 82 11/16/2018    FRANCOIS 9.1 11/16/2018      No results found for: AST, ALT    No results found for: A1C    No results found for: INR        Problem List:     Patient Active Problem List   Diagnosis     Benign essential hypertension     Coronary artery anomaly     Paroxysmal atrial fibrillation (H)     Anomalous origin of right coronary artery           Medications:     Current Outpatient Prescriptions   Medication Sig Dispense Refill     aspirin 325 MG tablet Take 1 tablet (325 mg) by mouth daily       cyclobenzaprine (FLEXERIL) 10 MG tablet Take 5 mg by mouth        finasteride (PROSCAR) 5 MG tablet Take 5 mg by mouth       Lido-Capsaicin-Men-Methyl Sal (MEDI-PATCH-LIDOCAINE EX) Externally apply topically as needed       lisinopril (PRINIVIL/ZESTRIL) 5 MG tablet Take 1 tablet (5  mg) by mouth daily 30 tablet 1     metoprolol succinate (TOPROL-XL) 50 MG 24 hr tablet Take 1 tablet (50 mg) by mouth daily 30 tablet 1     Omega-3 Fatty Acids (FISH OIL PO) Take 2 capsules by mouth       simvastatin (ZOCOR) 20 MG tablet Take 10 mg by mouth        amLODIPine (NORVASC) 10 MG tablet Take 10 mg by mouth             Past Medical History:   History reviewed. No pertinent past medical history.  Past Surgical History:   Procedure Laterality Date     CARDIOVERSION  11/06/2018    AFIB with RVR     Family History   Problem Relation Age of Onset     Myocardial Infarction Father      Social History     Social History     Marital status:      Spouse name: N/A     Number of children: N/A     Years of education: N/A     Occupational History     Not on file.     Social History Main Topics     Smoking status: Never Smoker     Smokeless tobacco: Never Used     Alcohol use Yes      Comment: once per week     Drug use: Not on file     Sexual activity: Not on file     Other Topics Concern     Not on file     Social History Narrative           Allergies:   Review of patient's allergies indicates no known allergies.      Mimi Contreras PA-C  [facility]  Pager: [phone]      Thank you for allowing me to participate in the care of your patient.    Sincerely,     Mimi Contreras PA-C     [facility]

## 2018-11-30 NOTE — LETTER
11/30/2018    [address] [address]    RE: Jorge Drummond       Dear Colleague,    I had the pleasure of seeing Jorge Drummond in the [facility].    Cardiology Clinic Progress Note    Jorge Drummond MRN# [id number]   YOB: 1970 Age: 48 year old          Assessment and Plan:     In summary, Jorge Drummond presents today for follow-up of recent CTA showing mild, non-obstructive disease of the LAD and confirms presence of anomalous RCA with an intra-atrial course. He's feeling well and has had no symptoms of recurrent PAF since his admission two weeks ago.  He was having some intermittent chest discomfort following discharge but this has since resolved. He's currently limiting his aerobic exercise but hopes to get back to his routine regimen. His BP is elevated in clinic today, at 164/80 mmHg at end of visit per my re-check.      1. Mild CAD in LAD with CT score of 18.6  2. Atypical CP  3. Anomalous RCA with intra-atrial course  4. Symptomatic PAF s/p DCCV, on aspirin 325 with a [id number] of 1 - on BB and high dose aspirin  5. HTN, uncontrolled on Norvasc 10 and Toprol 50  6. HLP - on zocor 10  7. Pulmonary nodules incidentally noted on CT    Plan:  - Will arrange a stress MPI, holding beta blocker for 48 hours prior, to assess perfusion in the region of his anamolous RCA with exercise.  - Despite the lack of evidence supporting association between caffeine intake and AF, given the proximity of his regimen of using C4/pre-workout to his recent admission, I've recommended avoidance of pre-workout and equivalents. He's agreeable to this.   - Continue management of mild CAD with aspirin and statin.  - Will start low-dose lisinopril for uncontrolled HTN today. Ideally, will be able to gain better control by simply increasing Toprol in the future, but as upcoming testing requires temporary BB hold I will put  "him on this at least for the short-term.   - Return to see me in one week, with BMP prior.   - [facility]  - Advised to f/u with PCP regarding lung nodules.         History of Presenting Illness:      Jorge Drummond is a pleasant 48 year old patient of  Dr. Brown who presents today for follow up of recent CTA.    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 He was recently admitted for an episode of symptomatic atrial fibrillation requiring cardioversion. He was discharged home in SR and metoprolol was added to his regimen. When seen in consultation by Dr. Brown he'd noted some intermittent chest discomfort since discharge. A CTA was ordered to assess for significant coronary artery disease as well as the course of his RCA. These results are detailed below.     Today, he feels well overall. Patient denies recurrent chest pain, shortness of breath, PND, orthopnea, edema, palpitations, near syncope or syncope. He used to run frequently but due to some chronic issues with his ankle and spine has recently been exercising via alternate forms, although is still routinely active at baseline. He'd been using C4 pre-workout prior to his workouts (elliptical alternating with short treadmill runs) during the lunch hour prior to his episode of atrial fib. Since that time, he's been doing some intermittent weight lifting but has been avoiding aerobic exercise. He works in the [employer] department at the [employer]. He states he did spend about 6 months in [place] when he was in the , and used some cigars then. He otherwise has no tobacco history.          Review of Systems:     12-pt ROS is negative except for as noted in the HPI.          Physical Exam:     Vitals: /74  Pulse 64  Ht 1.753 m (5' 9\")  Wt 103.7 kg (228 lb 11.2 oz)  " BMI 33.77 kg/m2  Wt Readings from Last 3 Encounters:   11/30/18 103.7 kg (228 lb 11.2 oz)   11/20/18 102.9 kg (226 lb 12.8 oz)   11/16/18 98 kg (216 lb 0.8 oz)       Constitutional:  Patient is pleasant, alert, cooperative, and in NAD.  HEENT:  NCAT. PERRLA. EOM's intact. No masses or thyromegaly. Teeth in normal repair.   Neck:  CVP appears normal. No carotid bruits.   Chest:  Non-tender, normal A/P diameter.   Pulmonary: Normal respiratory effort. CTAB.   Cardiac: RRR, normal S1/S2, no S3/S4, no murmur or rub.   Abdomen:  Non-tender abdomen with normoactive bowel sounds, no hepatosplenomegaly appreciated.   Vascular: Pulses in the upper and lower extremities are 2+ and equal bilaterally.  Extremities: No edema, erythema, cyanosis or tenderness appreciated.  Skin:  No rashes or lesions appreciated.   Neurological:  No gross motor or sensory deficits.   Psych: Appropriate affect.        Data:     Cardiac Diagnostics reviewed:  Type Date Result   Stress echo 9/2/10 FINAL IMPRESSION  1. Image quality was good with contrast.  2. Normal stress exercise with adequate heart rate and work load.  3. Excellent exercise capacity.  4. No evidence of inducible ischemia.   The patient underwent stress echo by nurse supervised Addi protocol. He achieved stage 5 of Addi protocol. Maximal heart rate achieved  was 169 which was more than 85% of maximal predicted heart rate. Work load was 16.2 METS.   CTA 11/28/18 IMPRESSION:  1. Total Agatston score 18.61, placing the patient in the 72nd  percentile when compared to age and gender matched control group.  2. Mild nonobstructive coronary artery disease seen in the left  anterior descending artery.  Luminal irregularities seen in the  circumflex artery only  right coronary artery is again seen arising  from the left coronary cusp. The ostium of this artery appears patent.  The artery takes off at a fairly acute angle (20 degrees  approximately) and travels for a short distance  between the aorta and  the right ventricular outflow tract before resuming is normal course.  Luminal irregularities only present in this vessel.  IMPRESSION:   1. 2 mm pulmonary nodule in the superior aspect of the left lower  lobe, recommend follow-up as below.  2. Sequela of prior granulomatous disease.    8/31/10 IMPRESSION:  The right coronary artery arises from the left coronary sinus and runs between the right ventricular outflow tract and the aorta.  This represents a malignant coronary artery anomaly.    No coronary artery atherosclerotic disease. Calcium score = 0.      Recent Labs   Lab Test  11/16/18   1517   TSH  1.44       Lab Results   Component Value Date    WBC 8.7 11/16/2018    RBC 5.22 11/16/2018    HGB 15.2 11/16/2018    HCT 43.5 11/16/2018    MCV 83 11/16/2018    MCH 29.1 11/16/2018    MCHC 34.9 11/16/2018    RDW 12.4 11/16/2018     11/16/2018       Lab Results   Component Value Date     11/16/2018    POTASSIUM 3.5 11/16/2018    CHLORIDE 105 11/16/2018    CO2 27 11/16/2018    ANIONGAP 6 11/16/2018     (H) 11/16/2018    BUN 17 11/16/2018    CR 1.14 11/16/2018    GFRESTIMATED 68 11/16/2018    GFRESTBLACK 82 11/16/2018    FRANCOIS 9.1 11/16/2018      No results found for: AST, ALT    No results found for: A1C    No results found for: INR        Problem List:     Patient Active Problem List   Diagnosis     Benign essential hypertension     Coronary artery anomaly     Paroxysmal atrial fibrillation (H)     Anomalous origin of right coronary artery           Medications:     Current Outpatient Prescriptions   Medication Sig Dispense Refill     aspirin 325 MG tablet Take 1 tablet (325 mg) by mouth daily       cyclobenzaprine (FLEXERIL) 10 MG tablet Take 5 mg by mouth        finasteride (PROSCAR) 5 MG tablet Take 5 mg by mouth       Lido-Capsaicin-Men-Methyl Sal (MEDI-PATCH-LIDOCAINE EX) Externally apply topically as needed       lisinopril (PRINIVIL/ZESTRIL) 5 MG tablet Take 1 tablet (5  mg) by mouth daily 30 tablet 1     metoprolol succinate (TOPROL-XL) 50 MG 24 hr tablet Take 1 tablet (50 mg) by mouth daily 30 tablet 1     Omega-3 Fatty Acids (FISH OIL PO) Take 2 capsules by mouth       simvastatin (ZOCOR) 20 MG tablet Take 10 mg by mouth        amLODIPine (NORVASC) 10 MG tablet Take 10 mg by mouth             Past Medical History:   History reviewed. No pertinent past medical history.  Past Surgical History:   Procedure Laterality Date     CARDIOVERSION  11/06/2018    AFIB with RVR     Family History   Problem Relation Age of Onset     Myocardial Infarction Father      Social History     Social History     Marital status:      Spouse name: N/A     Number of children: N/A     Years of education: N/A     Occupational History     Not on file.     Social History Main Topics     Smoking status: Never Smoker     Smokeless tobacco: Never Used     Alcohol use Yes      Comment: once per week     Drug use: Not on file     Sexual activity: Not on file     Other Topics Concern     Not on file     Social History Narrative           Allergies:   Review of patient's allergies indicates no known allergies.      Mimi Contreras PA-C  [facility] [[email or web address]] [facility]  Pager: [phone]      Thank you for allowing me to participate in the care of your patient.      Sincerely,     Mimi Contreras PA-C     [facility]    cc:   Cooper Brown MD  [id number]  [facility]  [place] [address]

## 2018-12-05 ENCOUNTER — HOSPITAL ENCOUNTER (OUTPATIENT)
Dept: CARDIOLOGY | Facility: CLINIC | Age: 48
Discharge: HOME | End: 2018-12-05
Attending: PHYSICIAN ASSISTANT | Admitting: PHYSICIAN ASSISTANT
Payer: OTHER GOVERNMENT

## 2018-12-05 PROCEDURE — 93016 CV STRESS TEST SUPVJ ONLY: CPT | Performed by: INTERNAL MEDICINE

## 2018-12-05 PROCEDURE — A9502 TC99M TETROFOSMIN: HCPCS | Performed by: PHYSICIAN ASSISTANT

## 2018-12-05 PROCEDURE — 78452 HT MUSCLE IMAGE SPECT MULT: CPT

## 2018-12-05 PROCEDURE — 78452 HT MUSCLE IMAGE SPECT MULT: CPT | Mod: 26 | Performed by: INTERNAL MEDICINE

## 2018-12-05 PROCEDURE — 34300033 ZZH RX 343: Performed by: PHYSICIAN ASSISTANT

## 2018-12-05 PROCEDURE — 93018 CV STRESS TEST I&R ONLY: CPT | Performed by: INTERNAL MEDICINE

## 2018-12-05 RX ADMIN — TETROFOSMIN 5.85 MCI.: 1.38 INJECTION, POWDER, LYOPHILIZED, FOR SOLUTION INTRAVENOUS at 08:30

## 2018-12-05 RX ADMIN — TETROFOSMIN 18 MCI.: 1.38 INJECTION, POWDER, LYOPHILIZED, FOR SOLUTION INTRAVENOUS at 09:40

## 2018-12-07 DIAGNOSIS — I10 BENIGN ESSENTIAL HYPERTENSION: ICD-10-CM

## 2018-12-07 LAB
ANION GAP SERPL CALCULATED.3IONS-SCNC: 5 MMOL/L (ref 3–14)
BUN SERPL-MCNC: 17 MG/DL (ref 7–30)
CALCIUM SERPL-MCNC: 8.3 MG/DL (ref 8.5–10.1)
CHLORIDE SERPL-SCNC: 104 MMOL/L (ref 94–109)
CO2 SERPL-SCNC: 30 MMOL/L (ref 20–32)
CREAT SERPL-MCNC: 1.15 MG/DL (ref 0.66–1.25)
GFR SERPL CREATININE-BSD FRML MDRD: 67 ML/MIN/1.7M2
GLUCOSE SERPL-MCNC: 109 MG/DL (ref 70–99)
POTASSIUM SERPL-SCNC: 3.8 MMOL/L (ref 3.4–5.3)
SODIUM SERPL-SCNC: 139 MMOL/L (ref 133–144)

## 2018-12-07 PROCEDURE — 80048 BASIC METABOLIC PNL TOTAL CA: CPT | Performed by: PHYSICIAN ASSISTANT

## 2018-12-07 PROCEDURE — 36415 COLL VENOUS BLD VENIPUNCTURE: CPT | Performed by: PHYSICIAN ASSISTANT

## 2018-12-10 ENCOUNTER — CARE COORDINATION (OUTPATIENT)
Dept: CARDIOLOGY | Facility: CLINIC | Age: 48
End: 2018-12-10

## 2018-12-10 NOTE — PROGRESS NOTES
Received fax from [facility] in [place], [place] with pt's fasting cholesterol tests from 5/2/18 as requested by TARSHA Gutiérrez at OV on 11/30/18.  Results entered in Epic for review:      Pt takes simvastatin 10mg daily and daily fish oil supplement.     Per chart review, pt is scheduled for follow up OV with TARSHA Gutiérrez on 1/3/19.      Routed to TARSHA Gutiérrez for review.    CELIA Campbell 10:55 AM 12/10/2018

## 2018-12-11 ENCOUNTER — CARE COORDINATION (OUTPATIENT)
Dept: CARDIOLOGY | Facility: CLINIC | Age: 48
End: 2018-12-11

## 2018-12-11 DIAGNOSIS — I10 HTN (HYPERTENSION): Primary | ICD-10-CM

## 2018-12-12 ENCOUNTER — TELEPHONE (OUTPATIENT)
Dept: CARDIOLOGY | Facility: CLINIC | Age: 48
End: 2018-12-12

## 2018-12-12 NOTE — TELEPHONE ENCOUNTER
Called Pt per DR Brown :    Cooper Brown MD  [facility] Team 6             Normal perfusion at good exercise capacity is reassuring. Continue medical therapy and beta blockers. Call if recurrent chest pain. See me in a year. Pt informed of this information. ROB Blankenship RN

## 2018-12-14 DIAGNOSIS — I10 HTN (HYPERTENSION): ICD-10-CM

## 2018-12-14 LAB
ANION GAP SERPL CALCULATED.3IONS-SCNC: 11.3 MMOL/L (ref 6–17)
BUN SERPL-MCNC: 17 MG/DL (ref 7–30)
CALCIUM SERPL-MCNC: 9.3 MG/DL (ref 8.5–10.5)
CHLORIDE SERPL-SCNC: 102 MMOL/L (ref 98–107)
CO2 SERPL-SCNC: 29 MMOL/L (ref 23–29)
CREAT SERPL-MCNC: 1.29 MG/DL (ref 0.7–1.3)
GFR SERPL CREATININE-BSD FRML MDRD: 59 ML/MIN/1.7M2
GLUCOSE SERPL-MCNC: 103 MG/DL (ref 70–105)
POTASSIUM SERPL-SCNC: 4.3 MMOL/L (ref 3.5–5.1)
SODIUM SERPL-SCNC: 138 MMOL/L (ref 136–145)

## 2018-12-14 PROCEDURE — 36415 COLL VENOUS BLD VENIPUNCTURE: CPT | Performed by: PHYSICIAN ASSISTANT

## 2018-12-14 PROCEDURE — 80048 BASIC METABOLIC PNL TOTAL CA: CPT | Performed by: PHYSICIAN ASSISTANT

## 2018-12-18 DIAGNOSIS — I10 BENIGN ESSENTIAL HYPERTENSION: Primary | ICD-10-CM

## 2019-01-03 ENCOUNTER — OFFICE VISIT (OUTPATIENT)
Dept: CARDIOLOGY | Facility: CLINIC | Age: 49
End: 2019-01-03
Attending: PHYSICIAN ASSISTANT
Payer: OTHER GOVERNMENT

## 2019-01-03 VITALS
SYSTOLIC BLOOD PRESSURE: 122 MMHG | DIASTOLIC BLOOD PRESSURE: 74 MMHG | HEART RATE: 61 BPM | HEIGHT: 69 IN | BODY MASS INDEX: 32.58 KG/M2 | WEIGHT: 220 LBS

## 2019-01-03 DIAGNOSIS — I48.0 PAROXYSMAL ATRIAL FIBRILLATION (H): ICD-10-CM

## 2019-01-03 DIAGNOSIS — Q24.5 ANOMALOUS ORIGIN OF RIGHT CORONARY ARTERY: ICD-10-CM

## 2019-01-03 DIAGNOSIS — I10 BENIGN ESSENTIAL HYPERTENSION: Primary | ICD-10-CM

## 2019-01-03 DIAGNOSIS — I10 BENIGN ESSENTIAL HYPERTENSION: ICD-10-CM

## 2019-01-03 LAB
ANION GAP SERPL CALCULATED.3IONS-SCNC: 11.1 MMOL/L (ref 6–17)
BUN SERPL-MCNC: 17 MG/DL (ref 7–30)
CALCIUM SERPL-MCNC: 9.4 MG/DL (ref 8.5–10.5)
CHLORIDE SERPL-SCNC: 100 MMOL/L (ref 98–107)
CO2 SERPL-SCNC: 30 MMOL/L (ref 23–29)
CREAT SERPL-MCNC: 1.08 MG/DL (ref 0.7–1.3)
GFR SERPL CREATININE-BSD FRML MDRD: 72 ML/MIN/{1.73_M2}
GLUCOSE SERPL-MCNC: 89 MG/DL (ref 70–105)
POTASSIUM SERPL-SCNC: 4.1 MMOL/L (ref 3.5–5.1)
SODIUM SERPL-SCNC: 137 MMOL/L (ref 136–145)

## 2019-01-03 PROCEDURE — 99214 OFFICE O/P EST MOD 30 MIN: CPT | Performed by: PHYSICIAN ASSISTANT

## 2019-01-03 PROCEDURE — 36415 COLL VENOUS BLD VENIPUNCTURE: CPT | Performed by: PHYSICIAN ASSISTANT

## 2019-01-03 PROCEDURE — 80048 BASIC METABOLIC PNL TOTAL CA: CPT | Performed by: PHYSICIAN ASSISTANT

## 2019-01-03 RX ORDER — AMLODIPINE BESYLATE 10 MG/1
10 TABLET ORAL DAILY
COMMUNITY
Start: 2019-01-03 | End: 2020-03-05

## 2019-01-03 RX ORDER — LOSARTAN POTASSIUM 25 MG/1
25 TABLET ORAL DAILY
Qty: 90 TABLET | Refills: 3 | Status: SHIPPED | OUTPATIENT
Start: 2019-01-03 | End: 2019-01-03

## 2019-01-03 ASSESSMENT — MIFFLIN-ST. JEOR: SCORE: 1848.29

## 2019-01-03 NOTE — PATIENT INSTRUCTIONS
Today's Plan:   - Stop the lisinopril and re-start the amlodipine. Stay on the metoprolol.   - Remember to arrange a visit with your PCP to discuss the pulmonary nodules  - We'll call you to set up follow up with Dr. Brown in one year    If you have questions or concerns please call my nurse team at [phone].    Scheduling phone number: [phone]  Reminder: Please bring in all current medications, over the counter supplements and vitamin bottles to your next appointment.    It was a pleasure seeing you today!     Mimi Contreras PA-C

## 2019-01-03 NOTE — LETTER
1/3/2019    [address] [place] [address] [facility] [place] [address]    RE: Jorge Drmumond       Dear Colleague,    I had the pleasure of seeing Jorge Drummond in the [facility].    Cardiology Clinic Progress Note    Jorge Drummond MRN# [id number]   YOB: 1970 Age: 48 year old          Assessment and Plan:     In summary, Jorge Drummond presents today for follow-up of uncontrolled hypertension, s/p initiation of lisinopril one month ago. His BP today is better controlled. However he does note a persistent dry cough. Today, he tells me that he hadn't been taking his amlodipine since the metoprolol was started mid-November - he had mistakenly assumed that the metoprolol was in place of the amlodipine.     1. HTN  2. Mild CAD in LAD with CT score of 18.6 - on aspirin and statin  3. Atypical CP, resolved  4. Anomalous RCA with intra-atrial course, stress MPI with no evidence of ischemia and no chest pain to an acceptable workload  5. Symptomatic PAF s/p DCCV, on aspirin 325 with a [id number] of 1 - on BB and high dose aspirin  6. HLP - controlled on zocor 10 ([facility])  7. Pulmonary nodules incidentally noted on CT - advised f/u with PCP    Plan:  - He was advised to stop Lisinopril, re-start Norvasc 10, and continue metoprolol.   - He will f/u with his PCP for assessment of hypertension and pulmonary nodules.   - Advised to f/u with Dr. Brown in approximately one year.         History of Presenting Illness:      Jorge Drummnod is a pleasant 48 year old patient of  Dr. Brown who presents today for follow up of uncontrolled hypertension.     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 He was recently admitted for an episode of symptomatic atrial fibrillation  requiring cardioversion. He'd recently started using C4 pre-workout to augment his workouts (elliptical alternating with short treadmill runs) prior to this. He was discharged home in SR and metoprolol was added to his regimen. When seen in consultation by Dr. Brown he'd noted some intermittent chest discomfort since discharge. A CTA was ordered to assess for significant coronary artery disease as well as the course of his RCA. This did confirm an intra-atrial course. These results are further detailed below. Following this, he underwent a stress test which showed normal perfusion at a good exercise capacity. This was done while holding his beta blocker. He was advised to slowly increase activity as able after this, but with continued avoidance of C4 pre-workout and equivalents.     At his last visit with me, lisinopril 5 mg daily was initiated due to an uncontrolled blood pressure of 164/80, already on Norvasc 10 and Toprol 50. He presents today for reassessment.     Today, he feels well overall. He does note a persistent dry cough for the past three weeks however. He unfortunately was also sick with the flu over new year's eve. Patient denies recurrent chest pain, shortness of breath, PND, orthopnea, edema, palpitations, near syncope or syncope. He's been advancing his activity without any problems. He used to run frequently but due to some chronic issues with his ankle and spine has had to cut back. He works in the [employer] department at the [employer]. He states he did spend about 6 months in [place] when he was in the , and used some cigars then. He otherwise has no tobacco history.     BMP today shows an acceptable creatinine of 1.08, K+ 4.1. [facility] at [facility], these are from 5/2018, and show an LDL of 65, HDL 33, TG's 212, ; creatinine 0.8, K+ 4.0, glucose 89, AST 14, ALT, 29.          Review of Systems:     12-pt ROS is negative except for as noted in the HPI.           Physical Exam:     Vitals: There were no vitals taken for this visit.  Wt Readings from Last 3 Encounters:   11/30/18 103.7 kg (228 lb 11.2 oz)   11/20/18 102.9 kg (226 lb 12.8 oz)   11/16/18 98 kg (216 lb 0.8 oz)       Constitutional:  Patient is pleasant, alert, cooperative, and in NAD.  HEENT:  NCAT. PERRLA. EOM's intact. No masses or thyromegaly. Teeth in normal repair.   Neck:  CVP appears normal. No carotid bruits.   Chest:  Non-tender, normal A/P diameter.   Pulmonary: Normal respiratory effort. CTAB.   Cardiac: RRR, normal S1/S2, no S3/S4, no murmur or rub.   Extremities: No edema, erythema, cyanosis or tenderness appreciated.  Neurological:  No gross motor or sensory deficits.   Psych: Appropriate affect.        Data:     Cardiac Diagnostics reviewed:  Type Date Result   Stress MPI 12/5/18 The patient performed treadmill exercise using a Addi protocol,  completing 13 minutes and 51 seconds with an estimated workload of  16.9 METS.  The test was terminated due to fatigue. The heart rate was  78 beats per minute at baseline and increased to 176 beats at peak  exercise, which was 103% of the maximum predicted heart rate. The rest  blood pressure was 122/70 mm/Hg and peak blood pressure is 182/64mm/Hg with rate pressure product of 31,680. The patient experienced no symptoms during the test. The patient was not on a beta blocker.  1.  Myocardial perfusion imaging using single isotope technique  demonstrated normal myocardial perfusion.   2. Gated images demonstrated normal wall motion.  The left ventricular  systolic function is normal with a calculated ejection fraction of 57%.   Stress echo 9/2/10 FINAL IMPRESSION  1. Image quality was good with contrast.  2. Normal stress exercise with adequate heart rate and work load.  3. Excellent exercise capacity.  4. No evidence of inducible ischemia.   The patient underwent stress echo by nurse supervised Addi protocol. He achieved stage 5 of Addi protocol.  Maximal heart rate achieved  was 169 which was more than 85% of maximal predicted heart rate. Work load was 16.2 METS.   CTA 11/28/18 IMPRESSION:  1. Total Agatston score 18.61, placing the patient in the 72nd  percentile when compared to age and gender matched control group.  2. Mild nonobstructive coronary artery disease seen in the left  anterior descending artery.  Luminal irregularities seen in the  circumflex artery only  right coronary artery is again seen arising  from the left coronary cusp. The ostium of this artery appears patent.  The artery takes off at a fairly acute angle (20 degrees  approximately) and travels for a short distance between the aorta and  the right ventricular outflow tract before resuming is normal course.  Luminal irregularities only present in this vessel.  IMPRESSION:   1. 2 mm pulmonary nodule in the superior aspect of the left lower  lobe, recommend follow-up as below.  2. Sequela of prior granulomatous disease.    8/31/10 IMPRESSION:  The right coronary artery arises from the left coronary sinus and runs between the right ventricular outflow tract and the aorta.  This represents a malignant coronary artery anomaly.    No coronary artery atherosclerotic disease. Calcium score = 0.      Recent Labs   Lab Test 11/16/18  1517 05/02/18   HDL  --  33   TRIG  --  212   TSH 1.44  --        Lab Results   Component Value Date    WBC 8.7 11/16/2018    RBC 5.22 11/16/2018    HGB 15.2 11/16/2018    HCT 43.5 11/16/2018    MCV 83 11/16/2018    MCH 29.1 11/16/2018    MCHC 34.9 11/16/2018    RDW 12.4 11/16/2018     11/16/2018       Lab Results   Component Value Date     12/14/2018    POTASSIUM 4.3 12/14/2018    CHLORIDE 102 12/14/2018    CO2 29 12/14/2018    ANIONGAP 11.3 12/14/2018     12/14/2018    BUN 17 12/14/2018    CR 1.29 12/14/2018    GFRESTIMATED 59 (L) 12/14/2018    GFRESTBLACK 71 12/14/2018    FRANCOIS 9.3 12/14/2018      No results found for: AST, ALT    No results  found for: A1C    No results found for: INR        Problem List:     Patient Active Problem List   Diagnosis     Benign essential hypertension     Coronary artery anomaly     Paroxysmal atrial fibrillation (H)     Anomalous origin of right coronary artery           Medications:     Current Outpatient Medications   Medication Sig Dispense Refill     amLODIPine (NORVASC) 10 MG tablet Take 10 mg by mouth       aspirin 325 MG tablet Take 1 tablet (325 mg) by mouth daily       cyclobenzaprine (FLEXERIL) 10 MG tablet Take 5 mg by mouth        finasteride (PROSCAR) 5 MG tablet Take 5 mg by mouth       Lido-Capsaicin-Men-Methyl Sal (MEDI-PATCH-LIDOCAINE EX) Externally apply topically as needed       lisinopril (PRINIVIL/ZESTRIL) 5 MG tablet Take 1 tablet (5 mg) by mouth daily 30 tablet 1     metoprolol succinate (TOPROL-XL) 50 MG 24 hr tablet Take 1 tablet (50 mg) by mouth daily 30 tablet 1     Omega-3 Fatty Acids (FISH OIL PO) Take 2 capsules by mouth       simvastatin (ZOCOR) 20 MG tablet Take 10 mg by mouth              Past Medical History:     Past Medical History:   Diagnosis Date     Benign essential hypertension 11/19/2018     Coronary artery anomaly 11/19/2018    CT Coronary Angio Arteries 8/31/2010 Impressions   The right coronary artery arises from the left coronary sinus    and runs between the right ventricular outflow tract and the aorta.  This    represents a malignant coronary artery anomaly       Paroxysmal atrial fibrillation (H) 11/19/2018    ED visit 11/2018     Past Surgical History:   Procedure Laterality Date     CARDIOVERSION  11/06/2018    AFIB with RVR     Family History   Problem Relation Age of Onset     Myocardial Infarction Father      Social History     Socioeconomic History     Marital status:      Spouse name: Not on file     Number of children: Not on file     Years of education: Not on file     Highest education level: Not on file   Social Needs     Financial resource strain: Not on  file     Food insecurity - worry: Not on file     Food insecurity - inability: Not on file     Transportation needs - medical: Not on file     Transportation needs - non-medical: Not on file   Occupational History     Not on file   Tobacco Use     Smoking status: Never Smoker     Smokeless tobacco: Never Used   Substance and Sexual Activity     Alcohol use: Yes     Comment: once per week     Drug use: Not on file     Sexual activity: Not on file   Other Topics Concern     Parent/sibling w/ CABG, MI or angioplasty before 65F 55M? Not Asked   Social History Narrative     Not on file           Allergies:   Patient has no known allergies.      Mimi Contreras PA-C  [facility]  Pager: [phone]      Thank you for allowing me to participate in the care of your patient.      Sincerely,     Mimi Contreras PA-C     [facility]    cc:   Mimi Contreras PA-C  [address]  [place] [address]

## 2019-01-03 NOTE — PROGRESS NOTES
Cardiology Clinic Progress Note    Jorge Drummond MRN# [id number]   YOB: 1970 Age: 48 year old          Assessment and Plan:     In summary, Jorge Drummond presents today for follow-up of uncontrolled hypertension, s/p initiation of lisinopril one month ago. His BP today is better controlled. However he does note a persistent dry cough. Today, he tells me that he hadn't been taking his amlodipine since the metoprolol was started mid-November - he had mistakenly assumed that the metoprolol was in place of the amlodipine.     1. HTN  2. Mild CAD in LAD with CT score of 18.6 - on aspirin and statin  3. Atypical CP, resolved  4. Anomalous RCA with intra-atrial course, stress MPI with no evidence of ischemia and no chest pain to an acceptable workload  5. Symptomatic PAF s/p DCCV, on aspirin 325 with a [id number] of 1 - on BB and high dose aspirin  6. HLP - controlled on zocor 10 ([facility])  7. Pulmonary nodules incidentally noted on CT - advised f/u with PCP    Plan:  - He was advised to stop Lisinopril, re-start Norvasc 10, and continue metoprolol.   - He will f/u with his PCP for assessment of hypertension and pulmonary nodules.   - Advised to f/u with Dr. Brown in approximately one year.         History of Presenting Illness:      Jorge Drummond is a pleasant 48 year old patient of  Dr. Brown who presents today for follow up of uncontrolled hypertension.     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 He was recently admitted for an episode of symptomatic atrial fibrillation requiring cardioversion. He'd recently started using C4 pre-workout to augment his workouts (elliptical alternating with short treadmill runs) prior to this. He was discharged home in SR and metoprolol was added to his regimen. When seen in  consultation by Dr. Brown he'd noted some intermittent chest discomfort since discharge. A CTA was ordered to assess for significant coronary artery disease as well as the course of his RCA. This did confirm an intra-atrial course. These results are further detailed below. Following this, he underwent a stress test which showed normal perfusion at a good exercise capacity. This was done while holding his beta blocker. He was advised to slowly increase activity as able after this, but with continued avoidance of C4 pre-workout and equivalents.     At his last visit with me, lisinopril 5 mg daily was initiated due to an uncontrolled blood pressure of 164/80, already on Norvasc 10 and Toprol 50. He presents today for reassessment.     Today, he feels well overall. He does note a persistent dry cough for the past three weeks however. He unfortunately was also sick with the flu over new year's lj. Patient denies recurrent chest pain, shortness of breath, PND, orthopnea, edema, palpitations, near syncope or syncope. He's been advancing his activity without any problems. He used to run frequently but due to some chronic issues with his ankle and spine has had to cut back. He works in the [employer] department at the [employer]. He states he did spend about 6 months in [place] when he was in the , and used some cigars then. He otherwise has no tobacco history.     BMP today shows an acceptable creatinine of 1.08, K+ 4.1. [facility], these are from 5/2018, and show an LDL of 65, HDL 33, TG's 212, ; creatinine 0.8, K+ 4.0, glucose 89, AST 14, ALT, 29.          Review of Systems:     12-pt ROS is negative except for as noted in the HPI.          Physical Exam:     Vitals: There were no vitals taken for this visit.  Wt Readings from Last 3 Encounters:   11/30/18 103.7 kg (228 lb 11.2 oz)   11/20/18 102.9 kg (226 lb 12.8 oz)   11/16/18 98 kg (216 lb 0.8 oz)       Constitutional:   Patient is pleasant, alert, cooperative, and in NAD.  HEENT:  NCAT. PERRLA. EOM's intact. No masses or thyromegaly. Teeth in normal repair.   Neck:  CVP appears normal. No carotid bruits.   Chest:  Non-tender, normal A/P diameter.   Pulmonary: Normal respiratory effort. CTAB.   Cardiac: RRR, normal S1/S2, no S3/S4, no murmur or rub.   Extremities: No edema, erythema, cyanosis or tenderness appreciated.  Neurological:  No gross motor or sensory deficits.   Psych: Appropriate affect.        Data:     Cardiac Diagnostics reviewed:  Type Date Result   Stress MPI 12/5/18 The patient performed treadmill exercise using a Addi protocol,  completing 13 minutes and 51 seconds with an estimated workload of  16.9 METS.  The test was terminated due to fatigue. The heart rate was  78 beats per minute at baseline and increased to 176 beats at peak  exercise, which was 103% of the maximum predicted heart rate. The rest  blood pressure was 122/70 mm/Hg and peak blood pressure is 182/64mm/Hg with rate pressure product of 31,680. The patient experienced no symptoms during the test. The patient was not on a beta blocker.  1.  Myocardial perfusion imaging using single isotope technique  demonstrated normal myocardial perfusion.   2. Gated images demonstrated normal wall motion.  The left ventricular  systolic function is normal with a calculated ejection fraction of 57%.   Stress echo 9/2/10 FINAL IMPRESSION  1. Image quality was good with contrast.  2. Normal stress exercise with adequate heart rate and work load.  3. Excellent exercise capacity.  4. No evidence of inducible ischemia.   The patient underwent stress echo by nurse supervised Addi protocol. He achieved stage 5 of Addi protocol. Maximal heart rate achieved  was 169 which was more than 85% of maximal predicted heart rate. Work load was 16.2 METS.   CTA 11/28/18 IMPRESSION:  1. Total Agatston score 18.61, placing the patient in the 72nd  percentile when compared to age  and gender matched control group.  2. Mild nonobstructive coronary artery disease seen in the left  anterior descending artery.  Luminal irregularities seen in the  circumflex artery only  right coronary artery is again seen arising  from the left coronary cusp. The ostium of this artery appears patent.  The artery takes off at a fairly acute angle (20 degrees  approximately) and travels for a short distance between the aorta and  the right ventricular outflow tract before resuming is normal course.  Luminal irregularities only present in this vessel.  IMPRESSION:   1. 2 mm pulmonary nodule in the superior aspect of the left lower  lobe, recommend follow-up as below.  2. Sequela of prior granulomatous disease.    8/31/10 IMPRESSION:  The right coronary artery arises from the left coronary sinus and runs between the right ventricular outflow tract and the aorta.  This represents a malignant coronary artery anomaly.    No coronary artery atherosclerotic disease. Calcium score = 0.      Recent Labs   Lab Test 11/16/18  1517 05/02/18   HDL  --  33   TRIG  --  212   TSH 1.44  --        Lab Results   Component Value Date    WBC 8.7 11/16/2018    RBC 5.22 11/16/2018    HGB 15.2 11/16/2018    HCT 43.5 11/16/2018    MCV 83 11/16/2018    MCH 29.1 11/16/2018    MCHC 34.9 11/16/2018    RDW 12.4 11/16/2018     11/16/2018       Lab Results   Component Value Date     12/14/2018    POTASSIUM 4.3 12/14/2018    CHLORIDE 102 12/14/2018    CO2 29 12/14/2018    ANIONGAP 11.3 12/14/2018     12/14/2018    BUN 17 12/14/2018    CR 1.29 12/14/2018    GFRESTIMATED 59 (L) 12/14/2018    GFRESTBLACK 71 12/14/2018    FRANCOIS 9.3 12/14/2018      No results found for: AST, ALT    No results found for: A1C    No results found for: INR        Problem List:     Patient Active Problem List   Diagnosis     Benign essential hypertension     Coronary artery anomaly     Paroxysmal atrial fibrillation (H)     Anomalous origin of right  coronary artery           Medications:     Current Outpatient Medications   Medication Sig Dispense Refill     amLODIPine (NORVASC) 10 MG tablet Take 10 mg by mouth       aspirin 325 MG tablet Take 1 tablet (325 mg) by mouth daily       cyclobenzaprine (FLEXERIL) 10 MG tablet Take 5 mg by mouth        finasteride (PROSCAR) 5 MG tablet Take 5 mg by mouth       Lido-Capsaicin-Men-Methyl Sal (MEDI-PATCH-LIDOCAINE EX) Externally apply topically as needed       lisinopril (PRINIVIL/ZESTRIL) 5 MG tablet Take 1 tablet (5 mg) by mouth daily 30 tablet 1     metoprolol succinate (TOPROL-XL) 50 MG 24 hr tablet Take 1 tablet (50 mg) by mouth daily 30 tablet 1     Omega-3 Fatty Acids (FISH OIL PO) Take 2 capsules by mouth       simvastatin (ZOCOR) 20 MG tablet Take 10 mg by mouth              Past Medical History:     Past Medical History:   Diagnosis Date     Benign essential hypertension 11/19/2018     Coronary artery anomaly 11/19/2018    CT Coronary Angio Arteries 8/31/2010 Impressions   The right coronary artery arises from the left coronary sinus    and runs between the right ventricular outflow tract and the aorta.  This    represents a malignant coronary artery anomaly       Paroxysmal atrial fibrillation (H) 11/19/2018    ED visit 11/2018     Past Surgical History:   Procedure Laterality Date     CARDIOVERSION  11/06/2018    AFIB with RVR     Family History   Problem Relation Age of Onset     Myocardial Infarction Father      Social History     Socioeconomic History     Marital status:      Spouse name: Not on file     Number of children: Not on file     Years of education: Not on file     Highest education level: Not on file   Social Needs     Financial resource strain: Not on file     Food insecurity - worry: Not on file     Food insecurity - inability: Not on file     Transportation needs - medical: Not on file     Transportation needs - non-medical: Not on file   Occupational History     Not on file   Tobacco  Use     Smoking status: Never Smoker     Smokeless tobacco: Never Used   Substance and Sexual Activity     Alcohol use: Yes     Comment: once per week     Drug use: Not on file     Sexual activity: Not on file   Other Topics Concern     Parent/sibling w/ CABG, MI or angioplasty before 65F 55M? Not Asked   Social History Narrative     Not on file           Allergies:   Patient has no known allergies.      Mimi Contreras PA-C  [facility]  Pager: [phone]

## 2019-01-03 NOTE — LETTER
1/3/2019    [address] [place] [address] [facility] [place] [address]    RE: Jorge Drummond       Dear Colleague,    I had the pleasure of seeing Jorge Drummond in the [facility].    Cardiology Clinic Progress Note    Jorge Drummond MRN# [id number]   YOB: 1970 Age: 48 year old          Assessment and Plan:     In summary, Jorge Drummond presents today for follow-up of uncontrolled hypertension, s/p initiation of lisinopril one month ago. His BP today is better controlled. However he does note a persistent dry cough. Today, he tells me that he hadn't been taking his amlodipine since the metoprolol was started mid-November - he had mistakenly assumed that the metoprolol was in place of the amlodipine.     1. HTN  2. Mild CAD in LAD with CT score of 18.6 - on aspirin and statin  3. Atypical CP, resolved  4. Anomalous RCA with intra-atrial course, stress MPI with no evidence of ischemia and no chest pain to an acceptable workload  5. Symptomatic PAF s/p DCCV, on aspirin 325 with a [id number] of 1 - on BB and high dose aspirin  6. HLP - controlled on zocor 10 ([facility])  7. Pulmonary nodules incidentally noted on CT - advised f/u with PCP    Plan:  - He was advised to stop Lisinopril, re-start Norvasc 10, and continue metoprolol.   - He will f/u with his PCP for assessment of hypertension and pulmonary nodules.   - Advised to f/u with Dr. Brown in approximately one year.         History of Presenting Illness:      Jorge Drummond is a pleasant 48 year old patient of  Dr. Brown who presents today for follow up of uncontrolled hypertension.     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 He was recently admitted for an episode of symptomatic atrial fibrillation  requiring cardioversion. He'd recently started using C4 pre-workout to augment his workouts (elliptical alternating with short treadmill runs) prior to this. He was discharged home in SR and metoprolol was added to his regimen. When seen in consultation by Dr. Brown he'd noted some intermittent chest discomfort since discharge. A CTA was ordered to assess for significant coronary artery disease as well as the course of his RCA. This did confirm an intra-atrial course. These results are further detailed below. Following this, he underwent a stress test which showed normal perfusion at a good exercise capacity. This was done while holding his beta blocker. He was advised to slowly increase activity as able after this, but with continued avoidance of C4 pre-workout and equivalents.     At his last visit with me, lisinopril 5 mg daily was initiated due to an uncontrolled blood pressure of 164/80, already on Norvasc 10 and Toprol 50. He presents today for reassessment.     Today, he feels well overall. He does note a persistent dry cough for the past three weeks however. He unfortunately was also sick with the flu over new year's eve. Patient denies recurrent chest pain, shortness of breath, PND, orthopnea, edema, palpitations, near syncope or syncope. He's been advancing his activity without any problems. He used to run frequently but due to some chronic issues with his ankle and spine has had to cut back. He works in the [employer] department at the [employer]. He states he did spend about 6 months in [place] when he was in the , and used some cigars then. He otherwise has no tobacco history.     BMP today shows an acceptable creatinine of 1.08, K+ 4.1. [facility], these are from 5/2018, and show an LDL of 65, HDL 33, TG's 212, ; creatinine 0.8, K+ 4.0, glucose 89, AST 14, ALT, 29.          Review of Systems:     12-pt ROS is negative except for as noted in the HPI.           Physical Exam:     Vitals: There were no vitals taken for this visit.  Wt Readings from Last 3 Encounters:   11/30/18 103.7 kg (228 lb 11.2 oz)   11/20/18 102.9 kg (226 lb 12.8 oz)   11/16/18 98 kg (216 lb 0.8 oz)       Constitutional:  Patient is pleasant, alert, cooperative, and in NAD.  HEENT:  NCAT. PERRLA. EOM's intact. No masses or thyromegaly. Teeth in normal repair.   Neck:  CVP appears normal. No carotid bruits.   Chest:  Non-tender, normal A/P diameter.   Pulmonary: Normal respiratory effort. CTAB.   Cardiac: RRR, normal S1/S2, no S3/S4, no murmur or rub.   Extremities: No edema, erythema, cyanosis or tenderness appreciated.  Neurological:  No gross motor or sensory deficits.   Psych: Appropriate affect.        Data:     Cardiac Diagnostics reviewed:  Type Date Result   Stress MPI 12/5/18 The patient performed treadmill exercise using a Dadi protocol,  completing 13 minutes and 51 seconds with an estimated workload of  16.9 METS.  The test was terminated due to fatigue. The heart rate was  78 beats per minute at baseline and increased to 176 beats at peak  exercise, which was 103% of the maximum predicted heart rate. The rest  blood pressure was 122/70 mm/Hg and peak blood pressure is 182/64mm/Hg with rate pressure product of 31,680. The patient experienced no symptoms during the test. The patient was not on a beta blocker.  1.  Myocardial perfusion imaging using single isotope technique  demonstrated normal myocardial perfusion.   2. Gated images demonstrated normal wall motion.  The left ventricular  systolic function is normal with a calculated ejection fraction of 57%.   Stress echo 9/2/10 FINAL IMPRESSION  1. Image quality was good with contrast.  2. Normal stress exercise with adequate heart rate and work load.  3. Excellent exercise capacity.  4. No evidence of inducible ischemia.   The patient underwent stress echo by nurse supervised Addi protocol. He achieved stage 5 of Addi protocol.  Maximal heart rate achieved  was 169 which was more than 85% of maximal predicted heart rate. Work load was 16.2 METS.   CTA 11/28/18 IMPRESSION:  1. Total Agatston score 18.61, placing the patient in the 72nd  percentile when compared to age and gender matched control group.  2. Mild nonobstructive coronary artery disease seen in the left  anterior descending artery.  Luminal irregularities seen in the  circumflex artery only  right coronary artery is again seen arising  from the left coronary cusp. The ostium of this artery appears patent.  The artery takes off at a fairly acute angle (20 degrees  approximately) and travels for a short distance between the aorta and  the right ventricular outflow tract before resuming is normal course.  Luminal irregularities only present in this vessel.  IMPRESSION:   1. 2 mm pulmonary nodule in the superior aspect of the left lower  lobe, recommend follow-up as below.  2. Sequela of prior granulomatous disease.    8/31/10 IMPRESSION:  The right coronary artery arises from the left coronary sinus and runs between the right ventricular outflow tract and the aorta.  This represents a malignant coronary artery anomaly.    No coronary artery atherosclerotic disease. Calcium score = 0.      Recent Labs   Lab Test 11/16/18  1517 05/02/18   HDL  --  33   TRIG  --  212   TSH 1.44  --        Lab Results   Component Value Date    WBC 8.7 11/16/2018    RBC 5.22 11/16/2018    HGB 15.2 11/16/2018    HCT 43.5 11/16/2018    MCV 83 11/16/2018    MCH 29.1 11/16/2018    MCHC 34.9 11/16/2018    RDW 12.4 11/16/2018     11/16/2018       Lab Results   Component Value Date     12/14/2018    POTASSIUM 4.3 12/14/2018    CHLORIDE 102 12/14/2018    CO2 29 12/14/2018    ANIONGAP 11.3 12/14/2018     12/14/2018    BUN 17 12/14/2018    CR 1.29 12/14/2018    GFRESTIMATED 59 (L) 12/14/2018    GFRESTBLACK 71 12/14/2018    FRANCOIS 9.3 12/14/2018      No results found for: AST, ALT    No results  found for: A1C    No results found for: INR        Problem List:     Patient Active Problem List   Diagnosis     Benign essential hypertension     Coronary artery anomaly     Paroxysmal atrial fibrillation (H)     Anomalous origin of right coronary artery           Medications:     Current Outpatient Medications   Medication Sig Dispense Refill     amLODIPine (NORVASC) 10 MG tablet Take 10 mg by mouth       aspirin 325 MG tablet Take 1 tablet (325 mg) by mouth daily       cyclobenzaprine (FLEXERIL) 10 MG tablet Take 5 mg by mouth        finasteride (PROSCAR) 5 MG tablet Take 5 mg by mouth       Lido-Capsaicin-Men-Methyl Sal (MEDI-PATCH-LIDOCAINE EX) Externally apply topically as needed       lisinopril (PRINIVIL/ZESTRIL) 5 MG tablet Take 1 tablet (5 mg) by mouth daily 30 tablet 1     metoprolol succinate (TOPROL-XL) 50 MG 24 hr tablet Take 1 tablet (50 mg) by mouth daily 30 tablet 1     Omega-3 Fatty Acids (FISH OIL PO) Take 2 capsules by mouth       simvastatin (ZOCOR) 20 MG tablet Take 10 mg by mouth              Past Medical History:     Past Medical History:   Diagnosis Date     Benign essential hypertension 11/19/2018     Coronary artery anomaly 11/19/2018    CT Coronary Angio Arteries 8/31/2010 Impressions   The right coronary artery arises from the left coronary sinus    and runs between the right ventricular outflow tract and the aorta.  This    represents a malignant coronary artery anomaly       Paroxysmal atrial fibrillation (H) 11/19/2018    ED visit 11/2018     Past Surgical History:   Procedure Laterality Date     CARDIOVERSION  11/06/2018    AFIB with RVR     Family History   Problem Relation Age of Onset     Myocardial Infarction Father      Social History     Socioeconomic History     Marital status:      Spouse name: Not on file     Number of children: Not on file     Years of education: Not on file     Highest education level: Not on file   Social Needs     Financial resource strain: Not on  file     Food insecurity - worry: Not on file     Food insecurity - inability: Not on file     Transportation needs - medical: Not on file     Transportation needs - non-medical: Not on file   Occupational History     Not on file   Tobacco Use     Smoking status: Never Smoker     Smokeless tobacco: Never Used   Substance and Sexual Activity     Alcohol use: Yes     Comment: once per week     Drug use: Not on file     Sexual activity: Not on file   Other Topics Concern     Parent/sibling w/ CABG, MI or angioplasty before 65F 55M? Not Asked   Social History Narrative     Not on file         Allergies:   Patient has no known allergies.      Mimi Contreras PA-C  [facility]  Pager: [phone]    Thank you for allowing me to participate in the care of your patient.    Sincerely,     Mimi Contreras PA-C     [facility]

## 2019-05-23 ENCOUNTER — DOCUMENTATION ONLY (OUTPATIENT)
Dept: CARDIOLOGY | Facility: CLINIC | Age: 49
End: 2019-05-23

## 2019-05-23 NOTE — PROGRESS NOTES
RN received form from [facility] review requesting further information for medical necessity for Ct angiography. Dr. Brown completed form. RN faxed completed form to [phone] as directed on form.

## 2020-03-05 ENCOUNTER — OFFICE VISIT (OUTPATIENT)
Dept: CARDIOLOGY | Facility: CLINIC | Age: 50
End: 2020-03-05
Payer: OTHER GOVERNMENT

## 2020-03-05 VITALS
BODY MASS INDEX: 33.89 KG/M2 | WEIGHT: 228.8 LBS | HEIGHT: 69 IN | HEART RATE: 86 BPM | SYSTOLIC BLOOD PRESSURE: 148 MMHG | DIASTOLIC BLOOD PRESSURE: 79 MMHG

## 2020-03-05 DIAGNOSIS — Q24.5 ANOMALOUS ORIGIN OF RIGHT CORONARY ARTERY: ICD-10-CM

## 2020-03-05 DIAGNOSIS — I48.0 PAROXYSMAL ATRIAL FIBRILLATION (H): ICD-10-CM

## 2020-03-05 DIAGNOSIS — I10 BENIGN ESSENTIAL HYPERTENSION: Primary | ICD-10-CM

## 2020-03-05 PROCEDURE — 99214 OFFICE O/P EST MOD 30 MIN: CPT | Performed by: INTERNAL MEDICINE

## 2020-03-05 RX ORDER — ASPIRIN 325 MG
325 TABLET ORAL DAILY
COMMUNITY
Start: 2020-03-05

## 2020-03-05 RX ORDER — LOSARTAN POTASSIUM 50 MG/1
50 TABLET ORAL DAILY
Qty: 30 TABLET | Refills: 11 | Status: SHIPPED | OUTPATIENT
Start: 2020-03-05 | End: 2020-03-18

## 2020-03-05 RX ORDER — VERAPAMIL HYDROCHLORIDE 120 MG/1
120 CAPSULE, EXTENDED RELEASE ORAL AT BEDTIME
COMMUNITY

## 2020-03-05 ASSESSMENT — MIFFLIN-ST. JEOR: SCORE: 1878.21

## 2020-03-05 NOTE — PROGRESS NOTES
HPI and Plan:   I had the pleasure of seeing Jorge Drummond in cardiology clinic for follow-up.    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  Arose from the left coronary cusp at an acute angle.  His CT coronary angiography revealed mild coronary calcification with no obstructive disease.  He had a stress nuclear study in 2018 with exercise which revealed normal myocardial perfusion.    He returns for follow-up.  Since I last saw him, he has been off his metoprolol.  His primary care physician stopped it because he complained of sluggishness.  He is now on verapamil long-acting 120 mg daily.  However, his amlodipine was also continued.  He also has stopped his aspirin for no clear reason.    He denies any chest pain.  He had occasional palpitations when he drinks too much of caffeine.  There is no orthopnea or PND.  He is trying to lose weight by changing his diet.  His last HDL was 33 and triglycerides 212 in 2018.  No recent lipids are available.  His creatinine was normal in January 2019.      He has dyslipidemia is on simvastatin.       Physical exam  See below     Impression  1.  Paroxysmal atrial fibrillation  Remains in sinus rhythm.  He has chads vascular score of `1.  He has occasional palpitations when he takes too much of caffeine.  We talked about decreasing caffeine intake.  I will restart him on aspirin 325 mg daily since his chads vascular score is 1.  Continue long-acting verapamil.     2.    Anomalous right coronary artery  This is previously been noted by his cardiologist at [facility] before he switched to us.  This again confirmed on a CT coronary angiography in 2018 and a stress nuclear study subsequently was negative for ischemia.  He understands that the interarterial course can cause symptoms as well as arrhythmia.  However, he favors conservative  management particularly since he is asymptomatic and has normal perfusion on stress nuclear study.  Ideally, beta-blockers would be useful but he is symptomatic with beta-blocker symptoms of tiredness and sluggishness and therefore is on verapamil.     3.  Hyperlipidemia, continue simvastatin, managed by primary care physician.  I asked him to get lipids through his primary care physician's office and [facility]     4.  Hypertension  Blood pressure was elevated today.  I have asked him to stop his amlodipine as he is on verapamil and will start him on losartan 50 mg daily.  We will check a BMP next month.  If blood pressure still high, we may have to increase losartan.  He also has had some dry cough with lisinopril.      Thank you for allowing us to participate in the care of this nice patient.  I would like to see him back in follow-up in a year since a nurse practitioner visit.     Sincerely,     Cooper Brown MD    Orders Placed This Encounter   Procedures     Basic metabolic panel     Follow-Up with Cardiac Advanced Practice Provider       Orders Placed This Encounter   Medications     verapamil ER (VERELAN) 120 MG 24 hr capsule     Sig: Take 120 mg by mouth At Bedtime     losartan (COZAAR) 50 MG tablet     Sig: Take 1 tablet (50 mg) by mouth daily     Dispense:  30 tablet     Refill:  11     aspirin (ASA) 325 MG tablet     Sig: Take 1 tablet (325 mg) by mouth daily       Medications Discontinued During This Encounter   Medication Reason     aspirin 325 MG tablet Stopped by Patient     metoprolol succinate (TOPROL-XL) 50 MG 24 hr tablet Stopped by Patient     amLODIPine (NORVASC) 10 MG tablet          Encounter Diagnoses   Name Primary?     Benign essential hypertension Yes     Anomalous origin of right coronary artery      Paroxysmal atrial fibrillation (H)        CURRENT MEDICATIONS:  Current Outpatient Medications   Medication Sig Dispense Refill     aspirin (ASA) 325 MG  tablet Take 1 tablet (325 mg) by mouth daily       cyclobenzaprine (FLEXERIL) 10 MG tablet Take 5 mg by mouth daily        finasteride (PROSCAR) 5 MG tablet Take 5 mg by mouth daily        Lido-Capsaicin-Men-Methyl Sal (MEDI-PATCH-LIDOCAINE EX) Externally apply topically as needed       losartan (COZAAR) 50 MG tablet Take 1 tablet (50 mg) by mouth daily 30 tablet 11     Omega-3 Fatty Acids (FISH OIL PO) Take 2 capsules by mouth       simvastatin (ZOCOR) 20 MG tablet Take 10 mg by mouth        verapamil ER (VERELAN) 120 MG 24 hr capsule Take 120 mg by mouth At Bedtime         ALLERGIES   No Known Allergies    PAST MEDICAL HISTORY:  Past Medical History:   Diagnosis Date     Benign essential hypertension 11/19/2018     Coronary artery anomaly 11/19/2018    CT Coronary Angio Arteries 8/31/2010 Impressions   The right coronary artery arises from the left coronary sinus    and runs between the right ventricular outflow tract and the aorta.  This    represents a malignant coronary artery anomaly       Paroxysmal atrial fibrillation (H) 11/19/2018    ED visit 11/2018       PAST SURGICAL HISTORY:  Past Surgical History:   Procedure Laterality Date     CARDIOVERSION  11/06/2018    AFIB with RVR       FAMILY HISTORY:  Family History   Problem Relation Age of Onset     Myocardial Infarction Father        SOCIAL HISTORY:  Social History     Socioeconomic History     Marital status:      Spouse name: None     Number of children: None     Years of education: None     Highest education level: None   Occupational History     None   Social Needs     Financial resource strain: None     Food insecurity:     Worry: None     Inability: None     Transportation needs:     Medical: None     Non-medical: None   Tobacco Use     Smoking status: Never Smoker     Smokeless tobacco: Never Used   Substance and Sexual Activity     Alcohol use: Yes     Comment: once per week     Drug use: None     Sexual activity: None   Lifestyle      "Physical activity:     Days per week: None     Minutes per session: None     Stress: None   Relationships     Social connections:     Talks on phone: None     Gets together: None     Attends [employer] service: None     Active member of club or organization: None     Attends meetings of clubs or organizations: None     Relationship status: None     Intimate partner violence:     Fear of current or ex partner: None     Emotionally abused: None     Physically abused: None     Forced sexual activity: None   Other Topics Concern     Parent/sibling w/ CABG, MI or angioplasty before 65F 55M? Not Asked   Social History Narrative     None       Review of Systems:  Skin:  Negative       Eyes:  Positive for glasses    ENT:  Negative      Respiratory:  Positive for sleep apnea;CPAP     Cardiovascular:  chest pain;Negative;syncope or near-syncope;cyanosis;dizziness;edema;fatigue Positive for;palpitations heart racing with caffeine, occ., lightheadedness occ. when talking  Gastroenterology: Positive for reflux    Genitourinary:  Positive for prostate problem    Musculoskeletal:  Positive for back pain    Neurologic:  Positive for headaches    Psychiatric:  Positive for sleep disturbances    Heme/Lymph/Imm:  Negative      Endocrine:  Negative        Physical Exam:  Vitals: BP (!) 148/79 (BP Location: Left arm, Cuff Size: Adult Large)   Pulse 86   Ht 1.753 m (5' 9\")   Wt 103.8 kg (228 lb 12.8 oz)   BMI 33.79 kg/m      Constitutional:    overweight      Skin:  warm and dry to the touch          Head:  normocephalic        Eyes:  pupils equal and round        Lymph:No Cervical lymphadenopathy present     ENT:  no pallor or cyanosis        Neck:           Respiratory:  clear to auscultation         Cardiac: regular rhythm;normal S1 and S2     no presence of murmur                                                   GI:  not assessed this visit        Extremities and Muscular Skeletal:  no edema              Neurological:  no gross " motor deficits        Psych:  Alert and Oriented x 3        CC  [address] [facility]  [address] [place] [address]

## 2020-03-05 NOTE — LETTER
3/5/2020    [facility] [address] [facility] [place] [address]    RE: Jorge Wallacedana       Dear Colleague,    I had the pleasure of seeing Jorge Drummond in the [facility].    HPI and Plan:   I had the pleasure of seeing Jorge Drummond in cardiology clinic for follow-up.    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  Arose from the left coronary cusp at an acute angle.  His CT coronary angiography revealed mild coronary calcification with no obstructive disease.  He had a stress nuclear study in 2018 with exercise which revealed normal myocardial perfusion.    He returns for follow-up.  Since I last saw him, he has been off his metoprolol.  His primary care physician stopped it because he complained of sluggishness.  He is now on verapamil long-acting 120 mg daily.  However, his amlodipine was also continued.  He also has stopped his aspirin for no clear reason.    He denies any chest pain.  He had occasional palpitations when he drinks too much of caffeine.  There is no orthopnea or PND.  He is trying to lose weight by changing his diet.  His last HDL was 33 and triglycerides 212 in 2018.  No recent lipids are available.  His creatinine was normal in January 2019.      He has dyslipidemia is on simvastatin.       Physical exam  See below     Impression  1.  Paroxysmal atrial fibrillation  Remains in sinus rhythm.  He has chads vascular score of `1.  He has occasional palpitations when he takes too much of caffeine.  We talked about decreasing caffeine intake.  I will restart him on aspirin 325 mg daily since his chads vascular score is 1.  Continue long-acting verapamil.     2.    Anomalous right coronary artery  This is previously been noted by his cardiologist at [facility] before he switched to us.  This  again confirmed on a CT coronary angiography in 2018 and a stress nuclear study subsequently was negative for ischemia.  He understands that the interarterial course can cause symptoms as well as arrhythmia.  However, he favors conservative management particularly since he is asymptomatic and has normal perfusion on stress nuclear study.  Ideally, beta-blockers would be useful but he is symptomatic with beta-blocker symptoms of tiredness and sluggishness and therefore is on verapamil.     3.  Hyperlipidemia, continue simvastatin, managed by primary care physician.  I asked him to get lipids through his primary care physician's office and [facility]     4.  Hypertension  Blood pressure was elevated today.  I have asked him to stop his amlodipine as he is on verapamil and will start him on losartan 50 mg daily.  We will check a BMP next month.  If blood pressure still high, we may have to increase losartan.  He also has had some dry cough with lisinopril.      Thank you for allowing us to participate in the care of this nice patient.  I would like to see him back in follow-up in a year since a nurse practitioner visit.     Sincerely,     Cooper Brown MD    Orders Placed This Encounter   Procedures     Basic metabolic panel     Follow-Up with Cardiac Advanced Practice Provider       Orders Placed This Encounter   Medications     verapamil ER (VERELAN) 120 MG 24 hr capsule     Sig: Take 120 mg by mouth At Bedtime     losartan (COZAAR) 50 MG tablet     Sig: Take 1 tablet (50 mg) by mouth daily     Dispense:  30 tablet     Refill:  11     aspirin (ASA) 325 MG tablet     Sig: Take 1 tablet (325 mg) by mouth daily       Medications Discontinued During This Encounter   Medication Reason     aspirin 325 MG tablet Stopped by Patient     metoprolol succinate (TOPROL-XL) 50 MG 24 hr tablet Stopped by Patient     amLODIPine (NORVASC) 10 MG tablet          Encounter Diagnoses   Name Primary?      Benign essential hypertension Yes     Anomalous origin of right coronary artery      Paroxysmal atrial fibrillation (H)        CURRENT MEDICATIONS:  Current Outpatient Medications   Medication Sig Dispense Refill     aspirin (ASA) 325 MG tablet Take 1 tablet (325 mg) by mouth daily       cyclobenzaprine (FLEXERIL) 10 MG tablet Take 5 mg by mouth daily        finasteride (PROSCAR) 5 MG tablet Take 5 mg by mouth daily        Lido-Capsaicin-Men-Methyl Sal (MEDI-PATCH-LIDOCAINE EX) Externally apply topically as needed       losartan (COZAAR) 50 MG tablet Take 1 tablet (50 mg) by mouth daily 30 tablet 11     Omega-3 Fatty Acids (FISH OIL PO) Take 2 capsules by mouth       simvastatin (ZOCOR) 20 MG tablet Take 10 mg by mouth        verapamil ER (VERELAN) 120 MG 24 hr capsule Take 120 mg by mouth At Bedtime         ALLERGIES   No Known Allergies    PAST MEDICAL HISTORY:  Past Medical History:   Diagnosis Date     Benign essential hypertension 11/19/2018     Coronary artery anomaly 11/19/2018    CT Coronary Angio Arteries 8/31/2010 Impressions   The right coronary artery arises from the left coronary sinus    and runs between the right ventricular outflow tract and the aorta.  This    represents a malignant coronary artery anomaly       Paroxysmal atrial fibrillation (H) 11/19/2018    ED visit 11/2018       PAST SURGICAL HISTORY:  Past Surgical History:   Procedure Laterality Date     CARDIOVERSION  11/06/2018    AFIB with RVR       FAMILY HISTORY:  Family History   Problem Relation Age of Onset     Myocardial Infarction Father        SOCIAL HISTORY:  Social History     Socioeconomic History     Marital status:      Spouse name: None     Number of children: None     Years of education: None     Highest education level: None   Occupational History     None   Social Needs     Financial resource strain: None     Food insecurity:     Worry: None     Inability: None     Transportation needs:     Medical: None      "Non-medical: None   Tobacco Use     Smoking status: Never Smoker     Smokeless tobacco: Never Used   Substance and Sexual Activity     Alcohol use: Yes     Comment: once per week     Drug use: None     Sexual activity: None   Lifestyle     Physical activity:     Days per week: None     Minutes per session: None     Stress: None   Relationships     Social connections:     Talks on phone: None     Gets together: None     Attends [employer] service: None     Active member of club or organization: None     Attends meetings of clubs or organizations: None     Relationship status: None     Intimate partner violence:     Fear of current or ex partner: None     Emotionally abused: None     Physically abused: None     Forced sexual activity: None   Other Topics Concern     Parent/sibling w/ CABG, MI or angioplasty before 65F 55M? Not Asked   Social History Narrative     None       Review of Systems:  Skin:  Negative       Eyes:  Positive for glasses    ENT:  Negative      Respiratory:  Positive for sleep apnea;CPAP     Cardiovascular:  chest pain;Negative;syncope or near-syncope;cyanosis;dizziness;edema;fatigue Positive for;palpitations heart racing with caffeine, occ., lightheadedness occ. when talking  Gastroenterology: Positive for reflux    Genitourinary:  Positive for prostate problem    Musculoskeletal:  Positive for back pain    Neurologic:  Positive for headaches    Psychiatric:  Positive for sleep disturbances    Heme/Lymph/Imm:  Negative      Endocrine:  Negative        Physical Exam:  Vitals: BP (!) 148/79 (BP Location: Left arm, Cuff Size: Adult Large)   Pulse 86   Ht 1.753 m (5' 9\")   Wt 103.8 kg (228 lb 12.8 oz)   BMI 33.79 kg/m      Constitutional:    overweight      Skin:  warm and dry to the touch          Head:  normocephalic        Eyes:  pupils equal and round        Lymph:No Cervical lymphadenopathy present     ENT:  no pallor or cyanosis        Neck:           Respiratory:  clear to auscultation     "     Cardiac: regular rhythm;normal S1 and S2     no presence of murmur                                                   GI:  not assessed this visit        Extremities and Muscular Skeletal:  no edema              Neurological:  no gross motor deficits        Psych:  Alert and Oriented x 3            Thank you for allowing me to participate in the care of your patient.    Sincerely,     Cooper Brown MD     [facility]

## 2020-03-18 ENCOUNTER — DOCUMENTATION ONLY (OUTPATIENT)
Dept: CARDIOLOGY | Facility: CLINIC | Age: 50
End: 2020-03-18

## 2020-03-18 DIAGNOSIS — I10 BENIGN ESSENTIAL HYPERTENSION: ICD-10-CM

## 2020-03-18 RX ORDER — LOSARTAN POTASSIUM 50 MG/1
50 TABLET ORAL DAILY
Qty: 30 TABLET | Refills: 11 | Status: SHIPPED | OUTPATIENT
Start: 2020-03-18 | End: 2020-04-29

## 2020-04-16 ENCOUNTER — CARE COORDINATION (OUTPATIENT)
Dept: CARDIOLOGY | Facility: CLINIC | Age: 50
End: 2020-04-16

## 2020-04-16 NOTE — PROGRESS NOTES
Called and spoke with pt.  Discussed that OV on 4/24/20 with Jose Antonio, CNP needs to be changed to a virtual visit due to current Covid-19 Pandemic.  Pt verbalized understanding and agrees with plan for video visit- sent message to MA leads to call pt to discuss video visit process and arrange.  Pt also agrees to have BMP lab done at his PCP clinic, [facility] earlier in the week next week and will cancel lab appt at [facility] on 4/24/20.    BMP order faxed to [facility] (fx: [phone]).    CELIA Campbell 4:05 PM 4/16/2020

## 2020-04-21 ENCOUNTER — TRANSFERRED RECORDS (OUTPATIENT)
Dept: HEALTH INFORMATION MANAGEMENT | Facility: CLINIC | Age: 50
End: 2020-04-21

## 2020-04-22 LAB
ANION GAP SERPL CALCULATED.3IONS-SCNC: 8 MMOL/L (ref 7–16)
BUN SERPL-MCNC: 16 MG/DL (ref 7–26)
CALCIUM SERPL-MCNC: 9.1 MG/DL (ref 8.4–10.4)
CHLORIDE SERPLBLD-SCNC: 102 MMOL/L (ref 98–109)
CO2 SERPL-SCNC: 27 MMOL/L (ref 20–29)
CREAT SERPL-MCNC: 0.8 MG/DL (ref 0.73–1.18)
GFR SERPL CREATININE-BSD FRML MDRD: >60 ML/MIN/1.73M2 (ref 60–?)
GLUCOSE SERPL-MCNC: 98 MG/DL (ref 70–100)
POTASSIUM SERPL-SCNC: 4.2 MMOL/L (ref 3.5–5.1)
SODIUM SERPL-SCNC: 137 MMOL/L (ref 136–145)

## 2020-04-28 NOTE — PROGRESS NOTES
"Jorge Drummond is a 50 year old male who is being evaluated via a billable video visit.      The patient has been notified of following:     \"This video visit will be conducted via a call between you and your physician/provider. We have found that certain health care needs can be provided without the need for an in-person physical exam.  This service lets us provide the care you need with a video conversation.  If a prescription is necessary we can send it directly to your pharmacy.  If lab work is needed we can place an order for that and you can then stop by our lab to have the test done at a later time.    Video visits are billed at different rates depending on your insurance coverage.  Please reach out to your insurance provider with any questions.    If during the course of the call the physician/provider feels a video visit is not appropriate, you will not be charged for this service.\"    Patient has given verbal consent for Video visit? Yes    How would you like to obtain your AVS? Mail a copy    Patient would like the video invitation sent by: Send to e-mail at: [email or web address]    Will anyone else be joining your video visit? No    Vital signs reported by patient  BP.149/67  P. 71  Wt.220lb  Ht. 5'9\"  Review Of Systems  Skin: NEGATIVE  Eyes:Ears/Nose/Throat: NEGATIVE  Respiratory: sleep apnea,wears cpap  Cardiovascular:NEGATIVE  Gastrointestinal: NEGATIVE  Genitourinary:NEGATIVE   Musculoskeletal: NEGATIVE  Neurologic: NEGATIVE  Psychiatric: NEGATIVE  Hematologic/Lymphatic/Immunologic: NEGATIVE  Endocrine:  NEGATIVE  Sara Martin Lpn    Chief complaint: hypertension    Video-Visit Details    Type of service:  Video Visit      History of Present Illness:    Jorge Drummond is a 50 year old gentleman followed by Dr. Brown who I am having a follow up video visit with to follow up on his elevated blood pressure. At the last visit in March, he was taken off of Amlodipine and placed on " Losartan 50mg daily.    Jorge has a history of paroxysmal atrial fibrillation with previous cardioversion.  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  A CT coronary angiogram revealed mild coronary artery calcification with no obstructive disease and a stress nuclear study in 2018 with treadmill revealed normal perfusion.    He saw Dr. Brown in March and had been taken off of metoprolol by his primary care doctor and switched to verapamil due to feeling sluggish.  He was continued on amlodipine.  His aspirin was also stopped for reasons that are unclear to us.    He complained of occasional palpitations when he has caffeine otherwise no evidence of recurrent atrial fibrillation or palpitations    He is on simvastatin therapy at 20 mg daily.  Last lipids are from 2018 showing an HDL of 33 triglycerides 212. He is du soon to have these checked at the [facility].    BMP post Losartan: sodium 137 potassium 4.2 BUN 16 Creatinine 0.80    He notes no side effects on the Losartan; His BP at home however has been in the 140's/70's with heart rates 68-75    He denies chest pain; he does the elipitical most days with no symptoms of chest pain or SOB.      General Appearance:    no distress, overweight, upright.    ENT/Mouth:    membranes moist, no nasal discharge or bleeding gums. Normal head shape, no evidence of injury or laceration.    EYES:    no scleral icterus, normal conjunctivae    Neck:    no evidence of thyromegaly.     Chest/Lungs:    No audible wheezing equal chest wall expansion. Non labored breathing. No cough.    Cardiovascular:    No evidence of elevated jugular venous pressure. No evidence of pitting edema bilaterally    Abdomen:    no evidence of abdominal distention. No observed jaundice.    Extremities:    no cyanosis or clubbing noted.    Skin:    no xanthelasma, normal skin  collar. No evidence of facial lacerations.    Neurologic:    Normal arm motion bilateral, no tremors. No evidence of focal defect.    Psychiatric:    alert and oriented x3, calm    Impression/Plan:      1.  Paroxysmal atrial fibrillation  No evidence of reoccurence  [id number] score is 1.   -occasional palpitations when he takes too much of caffeine.     -continue Aspirin 325 mg daily.  - Continue long-acting verapamil.      2.    Anomalous right coronary artery  -previously noted by his cardiologist at [facility] before he switched to us.  This again confirmed on a CT coronary angiography in 2018 and a stress nuclear study subsequently was negative for ischemia.  He understands that the interarterial course can cause symptoms as well as arrhythmia.  However, he favors conservative management particularly since he is asymptomatic  -He has normal perfusion on stress nuclear study.    Ideally, we would prefer beta-blockers however, he has tiredness and sluggishness and therefore is on verapamil.     3.  Hyperlipidemia, continue simvastatin, managed by primary care physician.    -he is due for lipids through the [facility] soon.    4.  Hypertension-still uncontrolled but improving    -dry cough on Lisinopril  -increase Losartan 100mg daily  BMP was normal on start of losartan  Repeat video visit in 4-6 weeks-will send rx to [facility] for losartan 100mg daily    5. He asked about CBD oil for low back muscle spasms  No contraindication from a cardiac perspective.             Start: 04/29/2020 09:27 am  Stop: 04/29/2020 09:42 am    Originating Location (pt. Location): HOME    Distant Location (provider location):  HOME    Mode of Communication:  Video Conference via [employer]      HERMINIO Brown CNP

## 2020-04-29 ENCOUNTER — DOCUMENTATION ONLY (OUTPATIENT)
Dept: CARDIOLOGY | Facility: CLINIC | Age: 50
End: 2020-04-29

## 2020-04-29 ENCOUNTER — VIRTUAL VISIT (OUTPATIENT)
Dept: CARDIOLOGY | Facility: CLINIC | Age: 50
End: 2020-04-29
Payer: OTHER GOVERNMENT

## 2020-04-29 VITALS
DIASTOLIC BLOOD PRESSURE: 67 MMHG | BODY MASS INDEX: 32.58 KG/M2 | HEART RATE: 71 BPM | SYSTOLIC BLOOD PRESSURE: 149 MMHG | HEIGHT: 69 IN | WEIGHT: 220 LBS

## 2020-04-29 DIAGNOSIS — Q24.5 CORONARY ARTERY ANOMALY: ICD-10-CM

## 2020-04-29 DIAGNOSIS — I48.0 PAROXYSMAL ATRIAL FIBRILLATION (H): ICD-10-CM

## 2020-04-29 DIAGNOSIS — I10 BENIGN ESSENTIAL HYPERTENSION: ICD-10-CM

## 2020-04-29 DIAGNOSIS — I10 BENIGN ESSENTIAL HYPERTENSION: Primary | ICD-10-CM

## 2020-04-29 PROCEDURE — 99213 OFFICE O/P EST LOW 20 MIN: CPT | Mod: 95 | Performed by: NURSE PRACTITIONER

## 2020-04-29 RX ORDER — LOSARTAN POTASSIUM 100 MG/1
100 TABLET ORAL DAILY
Qty: 90 TABLET | Refills: 3 | Status: SHIPPED | OUTPATIENT
Start: 2020-04-29 | End: 2024-01-08

## 2020-04-29 RX ORDER — LOSARTAN POTASSIUM 100 MG/1
100 TABLET ORAL DAILY
Qty: 90 TABLET | Refills: 3 | Status: SHIPPED | OUTPATIENT
Start: 2020-04-29 | End: 2020-04-29

## 2020-04-29 ASSESSMENT — MIFFLIN-ST. JEOR: SCORE: 1838.29

## 2020-04-29 NOTE — PROGRESS NOTES
[facility] and they advised that they do need a paper copy and last office visit note discussing the change in losartan faxed to them at [phone]. RN will work with team in clinic to fax appropriate documentation.

## 2020-04-29 NOTE — PROGRESS NOTES
I sent  E script to [facility] for losartan 100mg daily which is an increase but I do not know if they need a copy of my note as well???  Can you check

## 2020-04-29 NOTE — LETTER
4/29/2020      RE: Jorge Drummond  [address] [facility] [address]       Dear Colleague,    Thank you for the opportunity to participate in the care of your patient, Joreg Drummond, at the [facility]. Please see a copy of my visit note below.    Jorge Drummond is a 50 year old gentleman followed by Dr. Brown who I am having a follow up video visit with to follow up on his elevated blood pressure. At the last visit in March, he was taken off of Amlodipine and placed on Losartan 50mg daily.    Jorge has a history of paroxysmal atrial fibrillation with previous cardioversion.  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  A CT coronary angiogram revealed mild coronary artery calcification with no obstructive disease and a stress nuclear study in 2018 with treadmill revealed normal perfusion.    He saw Dr. Brown in March and had been taken off of metoprolol by his primary care doctor and switched to verapamil due to feeling sluggish.  He was continued on amlodipine.  His aspirin was also stopped for reasons that are unclear to us.    He complained of occasional palpitations when he has caffeine otherwise no evidence of recurrent atrial fibrillation or palpitations    He is on simvastatin therapy at 20 mg daily.  Last lipids are from 2018 showing an HDL of 33 triglycerides 212. He is du soon to have these checked at the [facility].    BMP post Losartan: sodium 137 potassium 4.2 BUN 16 Creatinine 0.80    He notes no side effects on the Losartan; His BP at home however has been in the 140's/70's with heart rates 68-75    He denies chest pain; he does the elipitical most days with no symptoms of chest pain or SOB.      General Appearance:    no distress, overweight, upright.    ENT/Mouth:    membranes  moist, no nasal discharge or bleeding gums. Normal head shape, no evidence of injury or laceration.    EYES:    no scleral icterus, normal conjunctivae    Neck:    no evidence of thyromegaly.     Chest/Lungs:    No audible wheezing equal chest wall expansion. Non labored breathing. No cough.    Cardiovascular:    No evidence of elevated jugular venous pressure. No evidence of pitting edema bilaterally    Abdomen:    no evidence of abdominal distention. No observed jaundice.    Extremities:    no cyanosis or clubbing noted.    Skin:    no xanthelasma, normal skin collar. No evidence of facial lacerations.    Neurologic:    Normal arm motion bilateral, no tremors. No evidence of focal defect.    Psychiatric:    alert and oriented x3, calm    Impression/Plan:      1.  Paroxysmal atrial fibrillation  No evidence of reoccurence  [id number] score is 1.   -occasional palpitations when he takes too much of caffeine.     -continue Aspirin 325 mg daily.  - Continue long-acting verapamil.      2.    Anomalous right coronary artery  -previously noted by his cardiologist at [facility] before he switched to us.  This again confirmed on a CT coronary angiography in 2018 and a stress nuclear study subsequently was negative for ischemia.  He understands that the interarterial course can cause symptoms as well as arrhythmia.  However, he favors conservative management particularly since he is asymptomatic  -He has normal perfusion on stress nuclear study.    Ideally, we would prefer beta-blockers however, he has tiredness and sluggishness and therefore is on verapamil.     3.  Hyperlipidemia, continue simvastatin, managed by primary care physician.    -he is due for lipids through the [facility] soon.    4.  Hypertension-still uncontrolled but improving    -dry cough on Lisinopril  -increase Losartan 100mg daily  BMP was normal on start of losartan  Repeat video visit in 4-6 weeks-will send rx to [facility] for losartan 100mg daily    5. He asked  about CBD oil for low back muscle spasms  No contraindication from a cardiac perspective.      Please do not hesitate to contact me if you have any questions/concerns.     Sincerely,     HERMINIO Brown CNP

## 2020-06-17 ENCOUNTER — VIRTUAL VISIT (OUTPATIENT)
Dept: CARDIOLOGY | Facility: CLINIC | Age: 50
End: 2020-06-17
Attending: NURSE PRACTITIONER
Payer: OTHER GOVERNMENT

## 2020-06-17 VITALS
WEIGHT: 220 LBS | SYSTOLIC BLOOD PRESSURE: 133 MMHG | BODY MASS INDEX: 32.58 KG/M2 | HEART RATE: 81 BPM | DIASTOLIC BLOOD PRESSURE: 70 MMHG | HEIGHT: 69 IN

## 2020-06-17 DIAGNOSIS — I48.0 PAROXYSMAL ATRIAL FIBRILLATION (H): ICD-10-CM

## 2020-06-17 DIAGNOSIS — I10 BENIGN ESSENTIAL HYPERTENSION: Primary | ICD-10-CM

## 2020-06-17 DIAGNOSIS — Q24.5 CORONARY ARTERY ANOMALY: ICD-10-CM

## 2020-06-17 PROCEDURE — 99213 OFFICE O/P EST LOW 20 MIN: CPT | Mod: 95 | Performed by: NURSE PRACTITIONER

## 2020-06-17 ASSESSMENT — MIFFLIN-ST. JEOR: SCORE: 1838.29

## 2020-06-17 NOTE — LETTER
"6/17/2020    [address] [facility] [place] [address] [facility] [place] [address]    RE: Jorge Hoda       Dear Colleague,    I had the pleasure of seeing Jorge Drummond in the [facility].    Jorge Drummond is a 50 year old male who is being evaluated via a billable video visit.      The patient has been notified of following:     \"This video visit will be conducted via a call between you and your physician/provider. We have found that certain health care needs can be provided without the need for an in-person physical exam.  This service lets us provide the care you need with a video conversation.  If a prescription is necessary we can send it directly to your pharmacy.  If lab work is needed we can place an order for that and you can then stop by our lab to have the test done at a later time.    Video visits are billed at different rates depending on your insurance coverage.  Please reach out to your insurance provider with any questions.    If during the course of the call the physician/provider feels a video visit is not appropriate, you will not be charged for this service.\"    Patient has given verbal consent for Video visit? Yes    How would you like to obtain your AVS? Mail a copy  Patient would like the video invitation sent by: Send to e-mail at: [email or web address]    Will anyone else be joining your video visit? No       Review Of Systems  Skin: NEGATIVE  Eyes:Ears/Nose/Throat: wears glasses, has hearing loss  Respiratory: sleep apnea and wears CPAP  Cardiovascular:NEGATIVE  Gastrointestinal: IBS  Genitourinary:NEGATIVE  Musculoskeletal: mild arthritis  Neurologic: NEGATIVE  Psychiatric: NEGATIVE  Hematologic/Lymphatic/Immunologic: mild seasonal allergies  Endocrine:  NEGATIVE  Vitals today are:  /70  Pulse 81  Weight   220.0 lb  PUMA Meek    Video-Visit Details    Type of service:  Video Visit      History of Present " Illness:     Jorge Drummond is a 50 year old gentleman followed by Dr. Brown who I am having a follow up video visit with to follow up on his elevated blood pressures. He was taken off of Amlodipine and placed on Losartan 50mg daily which I increased to 100mg dialy for elevated blood pressures.     Jorge has a history of paroxysmal atrial fibrillation with previous cardioversion.  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  A CT coronary angiogram revealed mild coronary artery calcification with no obstructive disease and a stress nuclear study in 2018 with treadmill revealed normal perfusion.     He saw Dr. Brown in March and had been taken off of metoprolol by his primary care doctor and switched to verapamil due to feeling sluggish.  Amlodipine was eventually stopped.  His aspirin was also stopped for reasons that were unclear to me and it has been restarted.     He complained of occasional palpitations when he has caffeine otherwise no evidence of recurrent atrial fibrillation or palpitations     He is on simvastatin therapy at 20 mg daily.  He had lipids done recently at the [facility]; total cholesterol 182  (improved) HDL 34 LDL 96.     BMP post Losartan increase: sodium 137 potassium 4.2 BUN 16 Creatinine 0.80     He notes no side effects on the Losartan; His BP now has been firmly under 130.     He denies chest pain; he does the elipitical most days with no symptoms of chest pain or SOB.        Impression/Plan:        1.  Paroxysmal atrial fibrillation  No evidence of reoccurence  [id number] score is 1.   -occasional palpitations when he takes too much of caffeine.     -continue Aspirin 325 mg daily.  - Continue long-acting verapamil.      2.    Anomalous right coronary artery  -previously noted by his cardiologist at [facility] before he switched to us.  This again  confirmed on a CT coronary angiography in 2018 and a stress nuclear study subsequently was negative for ischemia.  He understands that the interarterial course can cause symptoms as well as arrhythmia.  However, he favors conservative management particularly since he is asymptomatic  -He has normal perfusion on stress nuclear study.    Ideally, we would prefer beta-blockers however, he has tiredness and sluggishness and therefore is on verapamil.     3.  Hyperlipidemia-controlled   continue simvastatin, managed by primary care physician through the [facility] soon.   change fish oil to 1000mg DHA;EPA, 2 per day    4.  Hypertension   Improved control  -dry cough on Lisinopril  -continue Losartan 100mg daily/verapamil  BMP was normal on start of losartan     5. He asked about CBD oil for low back muscle spasms  No contraindication from a cardiac perspective.  He has started this and finds it to be helpful      We will see him back for his annual visit in the spring; he will contact me sooner if he has any issues      CURRENT MEDICATIONS:  Current Outpatient Medications   Medication Sig Dispense Refill     aspirin (ASA) 325 MG tablet Take 1 tablet (325 mg) by mouth daily       cyclobenzaprine (FLEXERIL) 10 MG tablet Take 5 mg by mouth daily        finasteride (PROSCAR) 5 MG tablet Take 5 mg by mouth daily        Lido-Capsaicin-Men-Methyl Sal (MEDI-PATCH-LIDOCAINE EX) Externally apply topically as needed       losartan (COZAAR) 100 MG tablet Take 1 tablet (100 mg) by mouth daily 90 tablet 3     Omega-3 Fatty Acids (FISH OIL PO) Take 2 capsules by mouth       simvastatin (ZOCOR) 20 MG tablet Take 10 mg by mouth        verapamil ER (VERELAN) 120 MG 24 hr capsule Take 120 mg by mouth At Bedtime         ALLERGIES   No Known Allergies    PAST MEDICAL HISTORY:  Past Medical History:   Diagnosis Date     Benign essential hypertension 11/19/2018     Coronary artery anomaly 11/19/2018    CT Coronary Angio Arteries 8/31/2010  Impressions   The right coronary artery arises from the left coronary sinus    and runs between the right ventricular outflow tract and the aorta.  This    represents a malignant coronary artery anomaly       Paroxysmal atrial fibrillation (H) 11/19/2018    ED visit 11/2018       General Appearance:    no distress, mildly overweight, upright.    ENT/Mouth:    membranes moist, no nasal discharge or bleeding gums. Normal head shape, no evidence of injury or laceration.    EYES:    no scleral icterus, normal conjunctivae    Neck:    no evidence of thyromegaly. Supple    Chest/Lungs:    No audible wheezing equal chest wall expansion. Non labored breathing. No cough.    Cardiovascular:    No evidence of elevated jugular venous pressure. No evidence of pitting edema bilaterally    Abdomen:    no evidence of abdominal distention. No observed jaundice.    Extremities:    no cyanosis or clubbing noted.    Skin:    no xanthelasma, normal skin collar. No evidence of facial lacerations.    Neurologic:    Normal arm motion bilateral, no tremors. No evidence of focal defect.    Psychiatric:    alert and oriented x3, calm      Thank you for allowing me to participate in the care of your patient.    Sincerely,     HERMINIO Brown [facility]

## 2020-06-17 NOTE — PROGRESS NOTES
"Jorge Drummond is a 50 year old male who is being evaluated via a billable video visit.      The patient has been notified of following:     \"This video visit will be conducted via a call between you and your physician/provider. We have found that certain health care needs can be provided without the need for an in-person physical exam.  This service lets us provide the care you need with a video conversation.  If a prescription is necessary we can send it directly to your pharmacy.  If lab work is needed we can place an order for that and you can then stop by our lab to have the test done at a later time.    Video visits are billed at different rates depending on your insurance coverage.  Please reach out to your insurance provider with any questions.    If during the course of the call the physician/provider feels a video visit is not appropriate, you will not be charged for this service.\"    Patient has given verbal consent for Video visit? Yes    How would you like to obtain your AVS? Mail a copy  Patient would like the video invitation sent by: Send to e-mail at: [email or web address]    Will anyone else be joining your video visit? No       Review Of Systems  Skin: NEGATIVE  Eyes:Ears/Nose/Throat: wears glasses, has hearing loss  Respiratory: sleep apnea and wears CPAP  Cardiovascular:NEGATIVE  Gastrointestinal: IBS  Genitourinary:NEGATIVE  Musculoskeletal: mild arthritis  Neurologic: NEGATIVE  Psychiatric: NEGATIVE  Hematologic/Lymphatic/Immunologic: mild seasonal allergies  Endocrine:  NEGATIVE  Vitals today are:  /70  Pulse 81  Weight   220.0 lb  PUMA Meek    Video-Visit Details    Type of service:  Video Visit      History of Present Illness:     Jorge Drummond is a 50 year old gentleman followed by Dr. Brown who I am having a follow up video visit with to follow up on his elevated blood pressures. He was taken off of Amlodipine and placed on Losartan 50mg daily which I increased to " 100mg dialy for elevated blood pressures.     Jorge has a history of paroxysmal atrial fibrillation with previous cardioversion.  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  A CT coronary angiogram revealed mild coronary artery calcification with no obstructive disease and a stress nuclear study in 2018 with treadmill revealed normal perfusion.     He saw Dr. Brown in March and had been taken off of metoprolol by his primary care doctor and switched to verapamil due to feeling sluggish.  Amlodipine was eventually stopped.  His aspirin was also stopped for reasons that were unclear to me and it has been restarted.     He complained of occasional palpitations when he has caffeine otherwise no evidence of recurrent atrial fibrillation or palpitations     He is on simvastatin therapy at 20 mg daily.  He had lipids done recently at the [facility]; total cholesterol 182  (improved) HDL 34 LDL 96.     BMP post Losartan increase: sodium 137 potassium 4.2 BUN 16 Creatinine 0.80     He notes no side effects on the Losartan; His BP now has been firmly under 130.     He denies chest pain; he does the elipitical most days with no symptoms of chest pain or SOB.        Impression/Plan:        1.  Paroxysmal atrial fibrillation  No evidence of reoccurence  [id number] score is 1.   -occasional palpitations when he takes too much of caffeine.     -continue Aspirin 325 mg daily.  - Continue long-acting verapamil.      2.    Anomalous right coronary artery  -previously noted by his cardiologist at [facility] before he switched to us.  This again confirmed on a CT coronary angiography in 2018 and a stress nuclear study subsequently was negative for ischemia.  He understands that the interarterial course can cause symptoms as well as arrhythmia.  However, he favors conservative management particularly  since he is asymptomatic  -He has normal perfusion on stress nuclear study.    Ideally, we would prefer beta-blockers however, he has tiredness and sluggishness and therefore is on verapamil.     3.  Hyperlipidemia-controlled   continue simvastatin, managed by primary care physician through the [facility].   change fish oil to 1000mg DHA;EPA, 2 per day    4.  Hypertension   Improved control  -dry cough on Lisinopril  -continue Losartan 100mg daily/verapamil  BMP was normal on start of losartan     5. He asked about CBD oil for low back muscle spasms  No contraindication from a cardiac perspective.  He has started this and finds it to be helpful      We will see him back for his annual visit in the spring; he will contact me sooner if he has any issues      CURRENT MEDICATIONS:  Current Outpatient Medications   Medication Sig Dispense Refill     aspirin (ASA) 325 MG tablet Take 1 tablet (325 mg) by mouth daily       cyclobenzaprine (FLEXERIL) 10 MG tablet Take 5 mg by mouth daily        finasteride (PROSCAR) 5 MG tablet Take 5 mg by mouth daily        Lido-Capsaicin-Men-Methyl Sal (MEDI-PATCH-LIDOCAINE EX) Externally apply topically as needed       losartan (COZAAR) 100 MG tablet Take 1 tablet (100 mg) by mouth daily 90 tablet 3     Omega-3 Fatty Acids (FISH OIL PO) Take 2 capsules by mouth       simvastatin (ZOCOR) 20 MG tablet Take 10 mg by mouth        verapamil ER (VERELAN) 120 MG 24 hr capsule Take 120 mg by mouth At Bedtime         ALLERGIES   No Known Allergies    PAST MEDICAL HISTORY:  Past Medical History:   Diagnosis Date     Benign essential hypertension 11/19/2018     Coronary artery anomaly 11/19/2018    CT Coronary Angio Arteries 8/31/2010 Impressions   The right coronary artery arises from the left coronary sinus    and runs between the right ventricular outflow tract and the aorta.  This    represents a malignant coronary artery anomaly       Paroxysmal atrial fibrillation (H) 11/19/2018    ED visit  11/2018       General Appearance:    no distress, mildly overweight, upright.    ENT/Mouth:    membranes moist, no nasal discharge or bleeding gums. Normal head shape, no evidence of injury or laceration.    EYES:    no scleral icterus, normal conjunctivae    Neck:    no evidence of thyromegaly. Supple    Chest/Lungs:    No audible wheezing equal chest wall expansion. Non labored breathing. No cough.    Cardiovascular:    No evidence of elevated jugular venous pressure. No evidence of pitting edema bilaterally    Abdomen:    no evidence of abdominal distention. No observed jaundice.    Extremities:    no cyanosis or clubbing noted.    Skin:    no xanthelasma, normal skin collar. No evidence of facial lacerations.    Neurologic:    Normal arm motion bilateral, no tremors. No evidence of focal defect.    Psychiatric:    alert and oriented x3, calm    Video Start: 06/17/2020 09:37 am  Video Stop: 06/17/2020 09:57 am     Originating Location (pt. Location): Home    Distant Location (provider location):  [facility]     Platform used for Video Visit: HERMINIO Anglin CNP

## 2020-06-18 NOTE — PATIENT INSTRUCTIONS
No changes today    See us in March or sooner if BP elevates    Call my nurse Amina at [phone] if you need assistance or have questions

## 2021-09-10 DIAGNOSIS — I10 BENIGN ESSENTIAL HYPERTENSION: Primary | ICD-10-CM

## 2021-11-10 ENCOUNTER — LAB (OUTPATIENT)
Dept: LAB | Facility: CLINIC | Age: 51
End: 2021-11-10
Payer: OTHER GOVERNMENT

## 2021-11-10 ENCOUNTER — OFFICE VISIT (OUTPATIENT)
Dept: CARDIOLOGY | Facility: CLINIC | Age: 51
End: 2021-11-10
Payer: OTHER GOVERNMENT

## 2021-11-10 VITALS
HEART RATE: 58 BPM | WEIGHT: 221.9 LBS | BODY MASS INDEX: 32.87 KG/M2 | HEIGHT: 69 IN | SYSTOLIC BLOOD PRESSURE: 136 MMHG | DIASTOLIC BLOOD PRESSURE: 72 MMHG

## 2021-11-10 DIAGNOSIS — Q24.5 ANOMALOUS ORIGIN OF RIGHT CORONARY ARTERY: Primary | ICD-10-CM

## 2021-11-10 DIAGNOSIS — I10 BENIGN ESSENTIAL HYPERTENSION: ICD-10-CM

## 2021-11-10 DIAGNOSIS — I48.0 PAROXYSMAL ATRIAL FIBRILLATION (H): ICD-10-CM

## 2021-11-10 DIAGNOSIS — Q24.5 CORONARY ARTERY ANOMALY: ICD-10-CM

## 2021-11-10 LAB
ANION GAP SERPL CALCULATED.3IONS-SCNC: 5 MMOL/L (ref 3–14)
BUN SERPL-MCNC: 22 MG/DL (ref 7–30)
CALCIUM SERPL-MCNC: 8.3 MG/DL (ref 8.5–10.1)
CHLORIDE BLD-SCNC: 106 MMOL/L (ref 94–109)
CO2 SERPL-SCNC: 27 MMOL/L (ref 20–32)
CREAT SERPL-MCNC: 0.91 MG/DL (ref 0.66–1.25)
GFR SERPL CREATININE-BSD FRML MDRD: >90 ML/MIN/1.73M2
GLUCOSE BLD-MCNC: 85 MG/DL (ref 70–99)
POTASSIUM BLD-SCNC: 4.1 MMOL/L (ref 3.4–5.3)
SODIUM SERPL-SCNC: 138 MMOL/L (ref 133–144)

## 2021-11-10 PROCEDURE — 36415 COLL VENOUS BLD VENIPUNCTURE: CPT | Performed by: NURSE PRACTITIONER

## 2021-11-10 PROCEDURE — 80048 BASIC METABOLIC PNL TOTAL CA: CPT | Performed by: NURSE PRACTITIONER

## 2021-11-10 PROCEDURE — 99213 OFFICE O/P EST LOW 20 MIN: CPT | Performed by: INTERNAL MEDICINE

## 2021-11-10 ASSESSMENT — MIFFLIN-ST. JEOR: SCORE: 1841.91

## 2021-11-10 NOTE — PROGRESS NOTES
HPI and Plan:     Jorge Drummond is a 51 year old gentleman with history of hypertension who has been on amlodipine and losartan.  He also has history of paroxysmal atrial fibrillation for which he underwent cardioversion.  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  He had a stress nuclear study that was negative for ischemia.  This was initially diagnosed at [facility] of any [facility] cardiologist in after discussing options, he had decided on conservative management.  His CT coronary in the past also had shown mild coronary calcification with no severe obstructive disease.    He has continued to exercise regularly 3 to 4 days a week, he walks or runs on a treadmill.  He has had no chest pain dizziness or syncope.  Occasional heart racing that is quite rare and not associate with any exertion.  He notices this more with increased caffeine intake.  He had some side effects to metoprolol and now was switched to verapamil with his primary care physician which he is tolerating well.  His blood pressure today is 136 x 72       Impression/Plan:        1.  Paroxysmal atrial fibrillation  No evidence of reoccurence  [id number] score is 1.   Continue aspirin.      2.    Anomalous right coronary artery  -previously noted by his cardiologist at [facility] before he switched to us.  This again confirmed on a CT coronary angiography in 2018 and a stress nuclear study subsequently was negative for ischemia.  He understands that the interarterial course can cause symptoms as well as arrhythmia.  However, he favors conservative management particularly since he is asymptomatic.  We had a discussion again any reinforces preference for pursuing conservative management.  He made the decision also when he was first diagnosed in the [facility] system.   Ideally, we would prefer beta-blockers however, he has tiredness and  sluggishness and therefore is on verapamil.     3.  Hyperlipidemia-controlled   continue simvastatin, managed by primary care physician through the [facility] soon.   change fish oil to 1000mg DHA;EPA, 2 per day     4.  Hypertension   Improved control  On losartan and verapamil.     He will return to see me in follow-up.  I suggested follow-up in 2 years if he is doing well as otherwise he has been stable.  He is agreeable.  He will call me there is any worsening chest pain or shortness of in the interim.    Sincerely,    Cooper Brown MD    25 minutes spent on the date of the encounter doing chart review, patient visit and documentation   {Provider  Link to [facility] Help Grid :1      No orders of the defined types were placed in this encounter.    No orders of the defined types were placed in this encounter.    There are no discontinued medications.    No diagnosis found.    CURRENT MEDICATIONS:  Current Outpatient Medications   Medication Sig Dispense Refill     aspirin (ASA) 325 MG tablet Take 1 tablet (325 mg) by mouth daily       finasteride (PROSCAR) 5 MG tablet Take 5 mg by mouth daily        Lido-Capsaicin-Men-Methyl Sal (MEDI-PATCH-LIDOCAINE EX) Externally apply topically as needed       losartan (COZAAR) 100 MG tablet Take 1 tablet (100 mg) by mouth daily 90 tablet 3     Omega-3 Fatty Acids (FISH OIL PO) Take 2 capsules by mouth       simvastatin (ZOCOR) 20 MG tablet Take 10 mg by mouth        verapamil ER (VERELAN) 120 MG 24 hr capsule Take 120 mg by mouth At Bedtime       cyclobenzaprine (FLEXERIL) 10 MG tablet Take 5 mg by mouth daily  (Patient not taking: Reported on 11/10/2021)         ALLERGIES   No Known Allergies    PAST MEDICAL HISTORY:  Past Medical History:   Diagnosis Date     Benign essential hypertension 11/19/2018     Coronary artery anomaly 11/19/2018    CT Coronary Angio Arteries 8/31/2010 Impressions   The right coronary artery arises from the left coronary sinus    and runs between the right  "ventricular outflow tract and the aorta.  This    represents a malignant coronary artery anomaly       Paroxysmal atrial fibrillation (H) 11/19/2018    ED visit 11/2018       PAST SURGICAL HISTORY:  Past Surgical History:   Procedure Laterality Date     CARDIOVERSION  11/06/2018    AFIB with RVR       FAMILY HISTORY:  Family History   Problem Relation Age of Onset     Myocardial Infarction Father        SOCIAL HISTORY:  Social History     Socioeconomic History     Marital status:      Spouse name: None     Number of children: None     Years of education: None     Highest education level: None   Occupational History     None   Tobacco Use     Smoking status: Never Smoker     Smokeless tobacco: Never Used   Substance and Sexual Activity     Alcohol use: Yes     Comment: once per week     Drug use: None     Sexual activity: None   Other Topics Concern     Parent/sibling w/ CABG, MI or angioplasty before 65F 55M? Not Asked   Social History Narrative     None     Social Determinants of Health     Financial Resource Strain: Not on file   Food Insecurity: Not on file   Transportation Needs: Not on file   Physical Activity: Not on file   Stress: Not on file   Social Connections: Not on file   Intimate Partner Violence: Not on file   Housing Stability: Not on file       Review of Systems:  Skin:  Negative     Eyes:  Positive for glasses  ENT:  Negative    Respiratory:  Positive for sleep apnea;CPAP  Cardiovascular:  Negative;palpitations;chest pain;lightheadedness;dizziness;syncope or near-syncope;cyanosis;fatigue;exercise intolerance;edema Positive for  Gastroenterology: Negative    Genitourinary:  Negative    Musculoskeletal:  Positive for joint pain  Neurologic:  Positive for headaches  Psychiatric:  Negative    Heme/Lymph/Imm:  Negative    Endocrine:  Negative      Physical Exam:  Vitals: /72   Pulse 58   Ht 1.753 m (5' 9\")   Wt 100.7 kg (221 lb 14.4 oz)   BMI 32.77 kg/m    Regular rate and rhythm " without murmurs.  No edema.  JVP not raised.    Recent Lab Results:  LIPID RESULTS:  Lab Results   Component Value Date    HDL 33 05/02/2018    TRIG 212 05/02/2018       LIVER ENZYME RESULTS:  No results found for: AST, ALT    CBC RESULTS:  Lab Results   Component Value Date    WBC 8.7 11/16/2018    RBC 5.22 11/16/2018    HGB 15.2 11/16/2018    HCT 43.5 11/16/2018    MCV 83 11/16/2018    MCH 29.1 11/16/2018    MCHC 34.9 11/16/2018    RDW 12.4 11/16/2018     11/16/2018       BMP RESULTS:  Lab Results   Component Value Date     11/10/2021     04/22/2020    POTASSIUM 4.1 11/10/2021    POTASSIUM 4.2 04/22/2020    CHLORIDE 106 11/10/2021    CHLORIDE 102 04/22/2020    CO2 27 11/10/2021    CO2 27 04/22/2020    ANIONGAP 5 11/10/2021    ANIONGAP 8 04/22/2020    GLC 85 11/10/2021    GLC 98 04/22/2020    BUN 22 11/10/2021    BUN 16 04/22/2020    CR 0.91 11/10/2021    CR 0.80 04/22/2020    GFRESTIMATED >90 11/10/2021    GFRESTIMATED >60 04/22/2020    GFRESTBLACK >60 04/22/2020    FRANCOIS 8.3 (L) 11/10/2021    FRANCOIS 9.1 04/22/2020        A1C RESULTS:  No results found for: A1C    INR RESULTS:  No results found for: INR      CC  No referring provider defined for this encounter.

## 2021-11-10 NOTE — LETTER
11/10/2021    [address]  [place] [address]    RE: Jorge NORBERTO Drummond       Dear Colleague,    I had the pleasure of seeing Jorge Drummond in the [facility].  HPI and Plan:     Jorge Drummond is a 51 year old gentleman with history of hypertension who has been on amlodipine and losartan.  He also has history of paroxysmal atrial fibrillation for which he underwent cardioversion.  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  He had a stress nuclear study that was negative for ischemia.  This was initially diagnosed at [facility] of any [facility] cardiologist in after discussing options, he had decided on conservative management.  His CT coronary in the past also had shown mild coronary calcification with no severe obstructive disease.    He has continued to exercise regularly 3 to 4 days a week, he walks or runs on a treadmill.  He has had no chest pain dizziness or syncope.  Occasional heart racing that is quite rare and not associate with any exertion.  He notices this more with increased caffeine intake.  He had some side effects to metoprolol and now was switched to verapamil with his primary care physician which he is tolerating well.  His blood pressure today is 136 x 72       Impression/Plan:        1.  Paroxysmal atrial fibrillation  No evidence of reoccurence  [id number] score is 1.   Continue aspirin.      2.    Anomalous right coronary artery  -previously noted by his cardiologist at [facility] before he switched to us.  This again confirmed on a CT coronary angiography in 2018 and a stress nuclear study subsequently was negative for ischemia.  He understands that the interarterial course can cause symptoms as well as arrhythmia.  However, he favors conservative management  particularly since he is asymptomatic.  We had a discussion again any reinforces preference for pursuing conservative management.  He made the decision also when he was first diagnosed in the [facility] system.   Ideally, we would prefer beta-blockers however, he has tiredness and sluggishness and therefore is on verapamil.     3.  Hyperlipidemia-controlled   continue simvastatin, managed by primary care physician through the [facility] soon.   change fish oil to 1000mg DHA;EPA, 2 per day     4.  Hypertension   Improved control  On losartan and verapamil.     He will return to see me in follow-up.  I suggested follow-up in 2 years if he is doing well as otherwise he has been stable.  He is agreeable.  He will call me there is any worsening chest pain or shortness of in the interim.    Sincerely,    Cooper Brown MD    25 minutes spent on the date of the encounter doing chart review, patient visit and documentation   {Provider  Link to [facility] Help Grid :1      No orders of the defined types were placed in this encounter.    No orders of the defined types were placed in this encounter.    There are no discontinued medications.    No diagnosis found.    CURRENT MEDICATIONS:  Current Outpatient Medications   Medication Sig Dispense Refill     aspirin (ASA) 325 MG tablet Take 1 tablet (325 mg) by mouth daily       finasteride (PROSCAR) 5 MG tablet Take 5 mg by mouth daily        Lido-Capsaicin-Men-Methyl Sal (MEDI-PATCH-LIDOCAINE EX) Externally apply topically as needed       losartan (COZAAR) 100 MG tablet Take 1 tablet (100 mg) by mouth daily 90 tablet 3     Omega-3 Fatty Acids (FISH OIL PO) Take 2 capsules by mouth       simvastatin (ZOCOR) 20 MG tablet Take 10 mg by mouth        verapamil ER (VERELAN) 120 MG 24 hr capsule Take 120 mg by mouth At Bedtime       cyclobenzaprine (FLEXERIL) 10 MG tablet Take 5 mg by mouth daily  (Patient not taking: Reported on 11/10/2021)         ALLERGIES   No Known Allergies    PAST MEDICAL  HISTORY:  Past Medical History:   Diagnosis Date     Benign essential hypertension 11/19/2018     Coronary artery anomaly 11/19/2018    CT Coronary Angio Arteries 8/31/2010 Impressions   The right coronary artery arises from the left coronary sinus    and runs between the right ventricular outflow tract and the aorta.  This    represents a malignant coronary artery anomaly       Paroxysmal atrial fibrillation (H) 11/19/2018    ED visit 11/2018       PAST SURGICAL HISTORY:  Past Surgical History:   Procedure Laterality Date     CARDIOVERSION  11/06/2018    AFIB with RVR       FAMILY HISTORY:  Family History   Problem Relation Age of Onset     Myocardial Infarction Father        SOCIAL HISTORY:  Social History     Socioeconomic History     Marital status:      Spouse name: None     Number of children: None     Years of education: None     Highest education level: None   Occupational History     None   Tobacco Use     Smoking status: Never Smoker     Smokeless tobacco: Never Used   Substance and Sexual Activity     Alcohol use: Yes     Comment: once per week     Drug use: None     Sexual activity: None   Other Topics Concern     Parent/sibling w/ CABG, MI or angioplasty before 65F 55M? Not Asked   Social History Narrative     None     Social Determinants of Health     Financial Resource Strain: Not on file   Food Insecurity: Not on file   Transportation Needs: Not on file   Physical Activity: Not on file   Stress: Not on file   Social Connections: Not on file   Intimate Partner Violence: Not on file   Housing Stability: Not on file       Review of Systems:  Skin:  Negative     Eyes:  Positive for glasses  ENT:  Negative    Respiratory:  Positive for sleep apnea;CPAP  Cardiovascular:  Negative;palpitations;chest pain;lightheadedness;dizziness;syncope or near-syncope;cyanosis;fatigue;exercise intolerance;edema Positive for  Gastroenterology: Negative    Genitourinary:  Negative    Musculoskeletal:  Positive for  "joint pain  Neurologic:  Positive for headaches  Psychiatric:  Negative    Heme/Lymph/Imm:  Negative    Endocrine:  Negative      Physical Exam:  Vitals: /72   Pulse 58   Ht 1.753 m (5' 9\")   Wt 100.7 kg (221 lb 14.4 oz)   BMI 32.77 kg/m    Regular rate and rhythm without murmurs.  No edema.  JVP not raised.    Recent Lab Results:  LIPID RESULTS:  Lab Results   Component Value Date    HDL 33 05/02/2018    TRIG 212 05/02/2018       LIVER ENZYME RESULTS:  No results found for: AST, ALT    CBC RESULTS:  Lab Results   Component Value Date    WBC 8.7 11/16/2018    RBC 5.22 11/16/2018    HGB 15.2 11/16/2018    HCT 43.5 11/16/2018    MCV 83 11/16/2018    MCH 29.1 11/16/2018    MCHC 34.9 11/16/2018    RDW 12.4 11/16/2018     11/16/2018     BMP RESULTS:  Lab Results   Component Value Date     11/10/2021     04/22/2020    POTASSIUM 4.1 11/10/2021    POTASSIUM 4.2 04/22/2020    CHLORIDE 106 11/10/2021    CHLORIDE 102 04/22/2020    CO2 27 11/10/2021    CO2 27 04/22/2020    ANIONGAP 5 11/10/2021    ANIONGAP 8 04/22/2020    GLC 85 11/10/2021    GLC 98 04/22/2020    BUN 22 11/10/2021    BUN 16 04/22/2020    CR 0.91 11/10/2021    CR 0.80 04/22/2020    GFRESTIMATED >90 11/10/2021    GFRESTIMATED >60 04/22/2020    GFRESTBLACK >60 04/22/2020    FRANCOIS 8.3 (L) 11/10/2021    FRANCOIS 9.1 04/22/2020      A1C RESULTS:  No results found for: A1C    INR RESULTS:  No results found for: INR    Cooper Brown MD   [facility]  "

## 2021-12-04 ENCOUNTER — HEALTH MAINTENANCE LETTER (OUTPATIENT)
Age: 51
End: 2021-12-04

## 2022-02-17 PROBLEM — Q24.5 CORONARY ARTERY ANOMALY: Status: ACTIVE | Noted: 2018-11-19

## 2022-09-18 ENCOUNTER — HEALTH MAINTENANCE LETTER (OUTPATIENT)
Age: 52
End: 2022-09-18

## 2023-01-28 ENCOUNTER — HEALTH MAINTENANCE LETTER (OUTPATIENT)
Age: 53
End: 2023-01-28

## 2024-01-08 ENCOUNTER — OFFICE VISIT (OUTPATIENT)
Dept: CARDIOLOGY | Facility: CLINIC | Age: 54
End: 2024-01-08
Payer: OTHER GOVERNMENT

## 2024-01-08 VITALS
SYSTOLIC BLOOD PRESSURE: 137 MMHG | HEIGHT: 69 IN | WEIGHT: 204 LBS | HEART RATE: 72 BPM | DIASTOLIC BLOOD PRESSURE: 65 MMHG | BODY MASS INDEX: 30.21 KG/M2

## 2024-01-08 DIAGNOSIS — Q24.5 CORONARY ARTERY ANOMALY: ICD-10-CM

## 2024-01-08 DIAGNOSIS — I48.0 PAROXYSMAL ATRIAL FIBRILLATION (H): ICD-10-CM

## 2024-01-08 DIAGNOSIS — Q24.5 ANOMALOUS ORIGIN OF RIGHT CORONARY ARTERY: ICD-10-CM

## 2024-01-08 DIAGNOSIS — I10 BENIGN ESSENTIAL HYPERTENSION: Primary | ICD-10-CM

## 2024-01-08 PROCEDURE — 99214 OFFICE O/P EST MOD 30 MIN: CPT | Performed by: INTERNAL MEDICINE

## 2024-01-08 RX ORDER — CHLORTHALIDONE 25 MG/1
0.5 TABLET ORAL DAILY
COMMUNITY
Start: 2023-02-27

## 2024-01-08 RX ORDER — LOSARTAN POTASSIUM 100 MG/1
100 TABLET ORAL DAILY
Qty: 90 TABLET | Refills: 3 | Status: SHIPPED | OUTPATIENT
Start: 2024-01-08

## 2024-01-08 RX ORDER — ROSUVASTATIN CALCIUM 20 MG/1
20 TABLET, COATED ORAL DAILY
COMMUNITY
Start: 2022-01-01 | End: 2024-02-28

## 2024-01-08 NOTE — LETTER
1/8/2024    [place] MD  [address] [place] [address]    RE: Jorge Matuteon       Dear Colleague,     I had the pleasure of seeing Jorge Drummond in the [facility].  HPI and Plan:      Jorge Drummond is a 53 year old gentleman with history of hypertension who has been on amlodipine and losartan.  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  He has favored conservative management.  He has previous stress nuclear study which was negative for ischemia.    He returns for a follow-up.  He is doing reasonably well.  He has lost 24 pounds over the last year by increasing his exercise and lowering caloric intake.  He feels much better.  In addition to running, he also bike this summer and felt well.  There is no history of any chest pain shortness of breath orthopnea or PND.  No palpitations or dizziness or syncope.    His blood pressures have improved.  He still remains on losartan and chlorthalidone which he will continue.  He is also on verapamil.  I did refill his losartan.  [facility]  He has not had a BMP recently and we will arrange that for him.      He is on rosuvastatin for his hyperlipidemia.     On exam, regular rate and rhythm.  No murmurs.  Chest was clear to auscultation.      Impression/Plan:        1.  Paroxysmal atrial fibrillation  No evidence of reoccurence  [id number] score is 1.   Continue aspirin.      2.    Anomalous right coronary artery  This was previously noted at [facility] by a CT angiogram.  He has had a stress nuclear study was negative for ischemia.  He remains asymptomatic and is able to exercise without any symptoms.  He has preferred conservative management as noted by my discussions with him in previous years.  Ideally should be on a beta-blocker but he has some tiredness with it.   He is on verapamil.     3.  Hyperlipidemia-controlled  On rosuvastatin.  Follows with [facility] for his lipid profile.  He told me that his triglycerides were better last year after he lost weight.     4.  Hypertension   Improved control  On losartan and verapamil.  He is also on chlorthalidone.  Will repeat BMP.     He will see me in 2 years or earlier as needed.     Sincerely,     Cooper Brown MD    Today's clinic visit entailed:    30 minutes spent by me on the date of the encounter doing chart review, review of test results, patient visit, and documentation   Provider  Link to [facility] Help Grid           No orders of the defined types were placed in this encounter.      Orders Placed This Encounter   Medications    chlorthalidone (HYGROTON) 25 MG tablet     Sig: Take 0.5 tablets by mouth daily    rosuvastatin (CRESTOR) 20 MG tablet     Sig: Take 20 mg by mouth daily       Medications Discontinued During This Encounter   Medication Reason    simvastatin (ZOCOR) 20 MG tablet          No diagnosis found.    CURRENT MEDICATIONS:  Current Outpatient Medications   Medication Sig Dispense Refill    aspirin (ASA) 325 MG tablet Take 1 tablet (325 mg) by mouth daily      chlorthalidone (HYGROTON) 25 MG tablet Take 0.5 tablets by mouth daily      cyclobenzaprine (FLEXERIL) 10 MG tablet Take 5 mg by mouth as needed      finasteride (PROSCAR) 5 MG tablet Take 5 mg by mouth daily       losartan (COZAAR) 100 MG tablet Take 1 tablet (100 mg) by mouth daily 90 tablet 3    Omega-3 Fatty Acids (FISH OIL PO) Take 2 capsules by mouth      rosuvastatin (CRESTOR) 20 MG tablet Take 20 mg by mouth daily      verapamil ER (VERELAN) 120 MG 24 hr capsule Take 120 mg by mouth At Bedtime      Lido-Capsaicin-Men-Methyl Sal (MEDI-PATCH-LIDOCAINE EX) Externally apply topically as needed (Patient not taking: Reported on 1/8/2024)         ALLERGIES   No Known Allergies    PAST MEDICAL HISTORY:  Past Medical History:   Diagnosis Date    Benign essential  "hypertension 11/19/2018    Coronary artery anomaly 11/19/2018    CT Coronary Angio Arteries 8/31/2010 Impressions   The right coronary artery arises from the left coronary sinus    and runs between the right ventricular outflow tract and the aorta.  This    represents a malignant coronary artery anomaly      Paroxysmal atrial fibrillation (H) 11/19/2018    ED visit 11/2018       PAST SURGICAL HISTORY:  Past Surgical History:   Procedure Laterality Date    CARDIOVERSION  11/06/2018    AFIB with RVR       FAMILY HISTORY:  Family History   Problem Relation Age of Onset    Myocardial Infarction Father        SOCIAL HISTORY:  Social History     Socioeconomic History    Marital status:      Spouse name: None    Number of children: None    Years of education: None    Highest education level: None   Tobacco Use    Smoking status: Never    Smokeless tobacco: Never   Substance and Sexual Activity    Alcohol use: Yes     Comment: a few drinks per month on occasion       Review of Systems:  Skin:          Eyes:         ENT:         Respiratory:  Positive for sleep apnea;CPAP     Cardiovascular:    Positive for;lightheadedness positional lightheadedness  Gastroenterology:        Genitourinary:         Musculoskeletal:         Neurologic:         Psychiatric:         Heme/Lymph/Imm:         Endocrine:  Negative thyroid disorder;diabetes      Physical Exam:  Vitals: /65   Pulse 72   Ht 1.753 m (5' 9\")   Wt 92.5 kg (204 lb)   BMI 30.13 kg/m            Cooper Brown MD  [address]  [place] [id number]      Thank you for allowing me to participate in the care of your patient.      Sincerely,     Cooper Brown MD     [facility]    "

## 2024-01-08 NOTE — PROGRESS NOTES
HPI and Plan:      Jorge Drummond is a 53 year old gentleman with history of hypertension who has been on amlodipine and losartan.  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  He has favored conservative management.  He has previous stress nuclear study which was negative for ischemia.    He returns for a follow-up.  He is doing reasonably well.  He has lost 24 pounds over the last year by increasing his exercise and lowering caloric intake.  He feels much better.  In addition to running, he also bike this summer and felt well.  There is no history of any chest pain shortness of breath orthopnea or PND.  No palpitations or dizziness or syncope.    His blood pressures have improved.  He still remains on losartan and chlorthalidone which he will continue.  He is also on verapamil.  I did refill his losartan.  [facility] gets [facility] work at [facility] a [facility].  He has not had a BMP recently and we will arrange that for him.      He is on rosuvastatin for his hyperlipidemia.     On exam, regular rate and rhythm.  No murmurs.  Chest was clear to auscultation.      Impression/Plan:        1.  Paroxysmal atrial fibrillation  No evidence of reoccurence  [id number] score is 1.   Continue aspirin.      2.    Anomalous right coronary artery  This was previously noted at [facility] by a CT angiogram.  He has had a stress nuclear study was negative for ischemia.  He remains asymptomatic and is able to exercise without any symptoms.  He has preferred conservative management as noted by my discussions with him in previous years.  Ideally should be on a beta-blocker but he has some tiredness with it.  He is on verapamil.     3.  Hyperlipidemia-controlled  On rosuvastatin.  Follows with [facility] for his lipid profile.  He told me that his triglycerides were better last year after he lost weight.     4.  Hypertension    Improved control  On losartan and verapamil.  He is also on chlorthalidone.  Will repeat BMP.     He will see me in 2 years or earlier as needed.     Sincerely,     Cooper Brown MD    Today's clinic visit entailed:    30 minutes spent by me on the date of the encounter doing chart review, review of test results, patient visit, and documentation   Provider  Link to MDM Help Grid           No orders of the defined types were placed in this encounter.      Orders Placed This Encounter   Medications    chlorthalidone (HYGROTON) 25 MG tablet     Sig: Take 0.5 tablets by mouth daily    rosuvastatin (CRESTOR) 20 MG tablet     Sig: Take 20 mg by mouth daily       Medications Discontinued During This Encounter   Medication Reason    simvastatin (ZOCOR) 20 MG tablet          No diagnosis found.    CURRENT MEDICATIONS:  Current Outpatient Medications   Medication Sig Dispense Refill    aspirin (ASA) 325 MG tablet Take 1 tablet (325 mg) by mouth daily      chlorthalidone (HYGROTON) 25 MG tablet Take 0.5 tablets by mouth daily      cyclobenzaprine (FLEXERIL) 10 MG tablet Take 5 mg by mouth as needed      finasteride (PROSCAR) 5 MG tablet Take 5 mg by mouth daily       losartan (COZAAR) 100 MG tablet Take 1 tablet (100 mg) by mouth daily 90 tablet 3    Omega-3 Fatty Acids (FISH OIL PO) Take 2 capsules by mouth      rosuvastatin (CRESTOR) 20 MG tablet Take 20 mg by mouth daily      verapamil ER (VERELAN) 120 MG 24 hr capsule Take 120 mg by mouth At Bedtime      Lido-Capsaicin-Men-Methyl Sal (MEDI-PATCH-LIDOCAINE EX) Externally apply topically as needed (Patient not taking: Reported on 1/8/2024)         ALLERGIES   No Known Allergies    PAST MEDICAL HISTORY:  Past Medical History:   Diagnosis Date    Benign essential hypertension 11/19/2018    Coronary artery anomaly 11/19/2018    CT Coronary Angio Arteries 8/31/2010 Impressions   The right coronary artery arises from the left coronary sinus    and runs between the right  "ventricular outflow tract and the aorta.  This    represents a malignant coronary artery anomaly      Paroxysmal atrial fibrillation (H) 11/19/2018    ED visit 11/2018       PAST SURGICAL HISTORY:  Past Surgical History:   Procedure Laterality Date    CARDIOVERSION  11/06/2018    AFIB with RVR       FAMILY HISTORY:  Family History   Problem Relation Age of Onset    Myocardial Infarction Father        SOCIAL HISTORY:  Social History     Socioeconomic History    Marital status:      Spouse name: None    Number of children: None    Years of education: None    Highest education level: None   Tobacco Use    Smoking status: Never    Smokeless tobacco: Never   Substance and Sexual Activity    Alcohol use: Yes     Comment: a few drinks per month on occasion       Review of Systems:  Skin:          Eyes:         ENT:         Respiratory:  Positive for sleep apnea;CPAP     Cardiovascular:    Positive for;lightheadedness positional lightheadedness  Gastroenterology:        Genitourinary:         Musculoskeletal:         Neurologic:         Psychiatric:         Heme/Lymph/Imm:         Endocrine:  Negative thyroid disorder;diabetes      Physical Exam:  Vitals: /65   Pulse 72   Ht 1.753 m (5' 9\")   Wt 92.5 kg (204 lb)   BMI 30.13 kg/m            Cooper Brown MD  [id number]  [id number]  [place] [id number]                "

## 2024-02-25 ENCOUNTER — HEALTH MAINTENANCE LETTER (OUTPATIENT)
Age: 54
End: 2024-02-25

## 2024-02-27 ENCOUNTER — TRANSFERRED RECORDS (OUTPATIENT)
Dept: HEALTH INFORMATION MANAGEMENT | Facility: CLINIC | Age: 54
End: 2024-02-27
Payer: OTHER GOVERNMENT

## 2025-03-15 ENCOUNTER — HEALTH MAINTENANCE LETTER (OUTPATIENT)
Age: 55
End: 2025-03-15

## (undated) RX ORDER — NITROGLYCERIN 0.4 MG/1
TABLET SUBLINGUAL
Status: DISPENSED
Start: 2018-11-28

## (undated) RX ORDER — METOPROLOL TARTRATE 1 MG/ML
INJECTION, SOLUTION INTRAVENOUS
Status: DISPENSED
Start: 2018-11-28

## (undated) RX ORDER — METOPROLOL TARTRATE 50 MG
TABLET ORAL
Status: DISPENSED
Start: 2018-11-28